# Patient Record
Sex: FEMALE | Race: WHITE | NOT HISPANIC OR LATINO | URBAN - METROPOLITAN AREA
[De-identification: names, ages, dates, MRNs, and addresses within clinical notes are randomized per-mention and may not be internally consistent; named-entity substitution may affect disease eponyms.]

---

## 2023-10-03 ENCOUNTER — PHARMACY VISIT (OUTPATIENT)
Dept: PHARMACY | Facility: CLINIC | Age: 38
End: 2023-10-03
Payer: COMMERCIAL

## 2023-10-03 DIAGNOSIS — E03.9 HYPOTHYROIDISM, UNSPECIFIED TYPE: Primary | ICD-10-CM

## 2023-10-03 PROCEDURE — RXMED WILLOW AMBULATORY MEDICATION CHARGE

## 2023-10-03 RX ORDER — LEVOTHYROXINE SODIUM 25 UG/1
TABLET ORAL
Qty: 90 TABLET | Refills: 1 | Status: SHIPPED | OUTPATIENT
Start: 2023-10-03 | End: 2024-10-02

## 2023-10-05 ENCOUNTER — PHARMACY VISIT (OUTPATIENT)
Dept: PHARMACY | Facility: CLINIC | Age: 38
End: 2023-10-05
Payer: COMMERCIAL

## 2023-10-05 PROCEDURE — RXMED WILLOW AMBULATORY MEDICATION CHARGE

## 2023-10-05 RX ORDER — BUSPIRONE HYDROCHLORIDE 30 MG/1
TABLET ORAL
Qty: 60 TABLET | Refills: 1 | OUTPATIENT
Start: 2023-10-04

## 2023-10-17 ENCOUNTER — PHARMACY VISIT (OUTPATIENT)
Dept: PHARMACY | Facility: CLINIC | Age: 38
End: 2023-10-17
Payer: COMMERCIAL

## 2023-10-17 PROCEDURE — RXMED WILLOW AMBULATORY MEDICATION CHARGE

## 2023-10-17 RX ORDER — QUETIAPINE FUMARATE 25 MG/1
TABLET, FILM COATED ORAL
Qty: 45 TABLET | Refills: 0 | OUTPATIENT
Start: 2023-10-17 | End: 2023-11-13 | Stop reason: SDUPTHER

## 2023-10-17 RX ORDER — BUPROPION HYDROCHLORIDE 300 MG/1
TABLET ORAL
Qty: 30 TABLET | Refills: 0 | OUTPATIENT
Start: 2023-10-17

## 2023-10-17 RX ORDER — BUSPIRONE HYDROCHLORIDE 30 MG/1
TABLET ORAL
Qty: 90 TABLET | Refills: 0 | OUTPATIENT
Start: 2023-10-17

## 2023-10-17 RX ORDER — PROPRANOLOL HYDROCHLORIDE 10 MG/1
TABLET ORAL
Qty: 30 TABLET | Refills: 0 | OUTPATIENT
Start: 2023-10-17

## 2023-10-20 ENCOUNTER — PHARMACY VISIT (OUTPATIENT)
Dept: PHARMACY | Facility: CLINIC | Age: 38
End: 2023-10-20
Payer: COMMERCIAL

## 2023-10-20 PROCEDURE — RXMED WILLOW AMBULATORY MEDICATION CHARGE

## 2023-10-20 RX ORDER — VENLAFAXINE HYDROCHLORIDE 75 MG/1
CAPSULE, EXTENDED RELEASE ORAL
Qty: 30 CAPSULE | Refills: 0 | OUTPATIENT
Start: 2023-10-20 | End: 2023-12-08 | Stop reason: SDUPTHER

## 2023-11-13 ENCOUNTER — PHARMACY VISIT (OUTPATIENT)
Dept: PHARMACY | Facility: CLINIC | Age: 38
End: 2023-11-13
Payer: COMMERCIAL

## 2023-11-13 PROCEDURE — RXMED WILLOW AMBULATORY MEDICATION CHARGE

## 2023-11-13 RX ORDER — BUSPIRONE HYDROCHLORIDE 30 MG/1
TABLET ORAL
Qty: 90 TABLET | Refills: 0 | OUTPATIENT
Start: 2023-11-13

## 2023-11-13 RX ORDER — VENLAFAXINE HYDROCHLORIDE 150 MG/1
CAPSULE, EXTENDED RELEASE ORAL
Qty: 30 CAPSULE | Refills: 0 | OUTPATIENT
Start: 2023-11-13 | End: 2023-12-08 | Stop reason: SDUPTHER

## 2023-11-13 RX ORDER — QUETIAPINE FUMARATE 25 MG/1
TABLET, FILM COATED ORAL
Qty: 45 TABLET | Refills: 0 | OUTPATIENT
Start: 2023-11-13 | End: 2023-12-08 | Stop reason: SDUPTHER

## 2023-11-14 ENCOUNTER — LAB REQUISITION (OUTPATIENT)
Dept: LAB | Facility: LAB | Age: 38
End: 2023-11-14

## 2023-11-14 LAB — SARS-COV-2 RNA RESP QL NAA+PROBE: NOT DETECTED

## 2023-11-14 PROCEDURE — 87635 SARS-COV-2 COVID-19 AMP PRB: CPT

## 2023-11-18 ENCOUNTER — PHARMACY VISIT (OUTPATIENT)
Dept: PHARMACY | Facility: CLINIC | Age: 38
End: 2023-11-18

## 2023-11-18 PROCEDURE — RXOTC WILLOW AMBULATORY OTC CHARGE

## 2023-12-08 PROCEDURE — RXMED WILLOW AMBULATORY MEDICATION CHARGE

## 2023-12-14 ENCOUNTER — PHARMACY VISIT (OUTPATIENT)
Dept: PHARMACY | Facility: CLINIC | Age: 38
End: 2023-12-14
Payer: COMMERCIAL

## 2024-01-20 ENCOUNTER — PHARMACY VISIT (OUTPATIENT)
Dept: PHARMACY | Facility: CLINIC | Age: 39
End: 2024-01-20
Payer: COMMERCIAL

## 2024-01-20 PROCEDURE — RXMED WILLOW AMBULATORY MEDICATION CHARGE

## 2024-01-20 PROCEDURE — RXOTC WILLOW AMBULATORY OTC CHARGE

## 2024-01-22 ENCOUNTER — PHARMACY VISIT (OUTPATIENT)
Dept: PHARMACY | Facility: CLINIC | Age: 39
End: 2024-01-22

## 2024-01-22 PROCEDURE — RXOTC WILLOW AMBULATORY OTC CHARGE

## 2024-02-03 PROCEDURE — RXMED WILLOW AMBULATORY MEDICATION CHARGE

## 2024-02-06 ENCOUNTER — PHARMACY VISIT (OUTPATIENT)
Dept: PHARMACY | Facility: CLINIC | Age: 39
End: 2024-02-06
Payer: COMMERCIAL

## 2024-02-06 PROCEDURE — RXOTC WILLOW AMBULATORY OTC CHARGE

## 2024-02-13 ENCOUNTER — PHARMACY VISIT (OUTPATIENT)
Dept: PHARMACY | Facility: CLINIC | Age: 39
End: 2024-02-13

## 2024-02-13 PROCEDURE — RXMED WILLOW AMBULATORY MEDICATION CHARGE

## 2024-02-28 ENCOUNTER — PHARMACY VISIT (OUTPATIENT)
Dept: PHARMACY | Facility: CLINIC | Age: 39
End: 2024-02-28
Payer: COMMERCIAL

## 2024-02-28 PROCEDURE — RXOTC WILLOW AMBULATORY OTC CHARGE

## 2024-02-28 PROCEDURE — RXMED WILLOW AMBULATORY MEDICATION CHARGE

## 2024-03-01 PROCEDURE — RXMED WILLOW AMBULATORY MEDICATION CHARGE

## 2024-03-06 ENCOUNTER — PHARMACY VISIT (OUTPATIENT)
Dept: PHARMACY | Facility: CLINIC | Age: 39
End: 2024-03-06
Payer: COMMERCIAL

## 2024-03-06 PROCEDURE — RXMED WILLOW AMBULATORY MEDICATION CHARGE

## 2024-03-29 ENCOUNTER — PHARMACY VISIT (OUTPATIENT)
Dept: PHARMACY | Facility: CLINIC | Age: 39
End: 2024-03-29
Payer: COMMERCIAL

## 2024-03-29 PROCEDURE — RXMED WILLOW AMBULATORY MEDICATION CHARGE

## 2024-03-29 RX ORDER — VILAZODONE HYDROCHLORIDE 40 MG/1
TABLET ORAL
Qty: 30 TABLET | Refills: 0 | OUTPATIENT
Start: 2024-03-29 | End: 2024-04-29 | Stop reason: SDUPTHER

## 2024-03-29 RX ORDER — BUSPIRONE HYDROCHLORIDE 30 MG/1
TABLET ORAL
Qty: 90 TABLET | Refills: 0 | OUTPATIENT
Start: 2024-03-29

## 2024-03-29 RX ORDER — QUETIAPINE FUMARATE 25 MG/1
TABLET, FILM COATED ORAL
Qty: 45 TABLET | Refills: 0 | OUTPATIENT
Start: 2024-03-29

## 2024-03-29 RX ORDER — VENLAFAXINE HYDROCHLORIDE 75 MG/1
CAPSULE, EXTENDED RELEASE ORAL
Qty: 30 CAPSULE | Refills: 0 | OUTPATIENT
Start: 2024-03-29

## 2024-04-17 ENCOUNTER — PHARMACY VISIT (OUTPATIENT)
Dept: PHARMACY | Facility: CLINIC | Age: 39
End: 2024-04-17

## 2024-04-17 PROCEDURE — RXOTC WILLOW AMBULATORY OTC CHARGE

## 2024-05-04 ENCOUNTER — PHARMACY VISIT (OUTPATIENT)
Dept: PHARMACY | Facility: CLINIC | Age: 39
End: 2024-05-04

## 2024-05-04 PROCEDURE — RXOTC WILLOW AMBULATORY OTC CHARGE

## 2024-05-04 PROCEDURE — RXMED WILLOW AMBULATORY MEDICATION CHARGE

## 2024-05-10 PROCEDURE — RXMED WILLOW AMBULATORY MEDICATION CHARGE

## 2024-05-15 PROCEDURE — RXMED WILLOW AMBULATORY MEDICATION CHARGE

## 2024-05-17 ENCOUNTER — PHARMACY VISIT (OUTPATIENT)
Dept: PHARMACY | Facility: CLINIC | Age: 39
End: 2024-05-17
Payer: COMMERCIAL

## 2024-06-26 ENCOUNTER — PHARMACY VISIT (OUTPATIENT)
Dept: PHARMACY | Facility: CLINIC | Age: 39
End: 2024-06-26
Payer: COMMERCIAL

## 2024-06-26 DIAGNOSIS — E03.9 HYPOTHYROIDISM, UNSPECIFIED TYPE: ICD-10-CM

## 2024-06-26 PROCEDURE — RXMED WILLOW AMBULATORY MEDICATION CHARGE

## 2024-06-26 RX ORDER — LEVOTHYROXINE SODIUM 25 UG/1
TABLET ORAL
Qty: 90 TABLET | Refills: 1 | OUTPATIENT
Start: 2024-06-26 | End: 2025-06-26

## 2024-08-01 PROCEDURE — RXMED WILLOW AMBULATORY MEDICATION CHARGE

## 2024-08-02 ENCOUNTER — PHARMACY VISIT (OUTPATIENT)
Dept: PHARMACY | Facility: CLINIC | Age: 39
End: 2024-08-02
Payer: COMMERCIAL

## 2024-08-14 ENCOUNTER — PHARMACY VISIT (OUTPATIENT)
Dept: PHARMACY | Facility: CLINIC | Age: 39
End: 2024-08-14
Payer: COMMERCIAL

## 2024-08-14 PROCEDURE — RXMED WILLOW AMBULATORY MEDICATION CHARGE

## 2024-08-14 PROCEDURE — RXOTC WILLOW AMBULATORY OTC CHARGE

## 2024-08-14 RX ORDER — DEXTROAMPHETAMINE SACCHARATE, AMPHETAMINE ASPARTATE, DEXTROAMPHETAMINE SULFATE AND AMPHETAMINE SULFATE 2.5; 2.5; 2.5; 2.5 MG/1; MG/1; MG/1; MG/1
TABLET ORAL
Qty: 30 TABLET | Refills: 0 | OUTPATIENT
Start: 2024-08-14

## 2024-08-28 PROCEDURE — RXMED WILLOW AMBULATORY MEDICATION CHARGE

## 2024-09-12 ENCOUNTER — PHARMACY VISIT (OUTPATIENT)
Dept: PHARMACY | Facility: CLINIC | Age: 39
End: 2024-09-12
Payer: COMMERCIAL

## 2024-09-24 PROCEDURE — RXMED WILLOW AMBULATORY MEDICATION CHARGE

## 2024-09-26 ENCOUNTER — PHARMACY VISIT (OUTPATIENT)
Dept: PHARMACY | Facility: CLINIC | Age: 39
End: 2024-09-26
Payer: COMMERCIAL

## 2024-10-21 PROCEDURE — RXMED WILLOW AMBULATORY MEDICATION CHARGE

## 2024-10-22 PROCEDURE — RXMED WILLOW AMBULATORY MEDICATION CHARGE

## 2024-10-25 ENCOUNTER — PHARMACY VISIT (OUTPATIENT)
Dept: PHARMACY | Facility: CLINIC | Age: 39
End: 2024-10-25
Payer: COMMERCIAL

## 2024-11-19 PROCEDURE — RXMED WILLOW AMBULATORY MEDICATION CHARGE

## 2024-11-20 ENCOUNTER — PHARMACY VISIT (OUTPATIENT)
Dept: PHARMACY | Facility: CLINIC | Age: 39
End: 2024-11-20
Payer: COMMERCIAL

## 2024-12-10 ENCOUNTER — APPOINTMENT (OUTPATIENT)
Dept: RADIOLOGY | Facility: HOSPITAL | Age: 39
End: 2024-12-10
Payer: COMMERCIAL

## 2024-12-10 ENCOUNTER — HOSPITAL ENCOUNTER (EMERGENCY)
Facility: HOSPITAL | Age: 39
Discharge: HOME | End: 2024-12-10
Payer: COMMERCIAL

## 2024-12-10 VITALS
RESPIRATION RATE: 18 BRPM | OXYGEN SATURATION: 100 % | SYSTOLIC BLOOD PRESSURE: 116 MMHG | HEIGHT: 64 IN | WEIGHT: 150 LBS | BODY MASS INDEX: 25.61 KG/M2 | DIASTOLIC BLOOD PRESSURE: 86 MMHG | HEART RATE: 82 BPM | TEMPERATURE: 98.8 F

## 2024-12-10 DIAGNOSIS — M25.552 LEFT HIP PAIN: ICD-10-CM

## 2024-12-10 DIAGNOSIS — S09.90XA INJURY OF HEAD, INITIAL ENCOUNTER: ICD-10-CM

## 2024-12-10 DIAGNOSIS — S16.1XXA STRAIN OF NECK MUSCLE, INITIAL ENCOUNTER: Primary | ICD-10-CM

## 2024-12-10 DIAGNOSIS — S93.402A SPRAIN OF LEFT ANKLE, UNSPECIFIED LIGAMENT, INITIAL ENCOUNTER: ICD-10-CM

## 2024-12-10 PROCEDURE — 72100 X-RAY EXAM L-S SPINE 2/3 VWS: CPT | Performed by: RADIOLOGY

## 2024-12-10 PROCEDURE — 70450 CT HEAD/BRAIN W/O DYE: CPT | Performed by: RADIOLOGY

## 2024-12-10 PROCEDURE — 70450 CT HEAD/BRAIN W/O DYE: CPT

## 2024-12-10 PROCEDURE — 2500000005 HC RX 250 GENERAL PHARMACY W/O HCPCS

## 2024-12-10 PROCEDURE — 72100 X-RAY EXAM L-S SPINE 2/3 VWS: CPT

## 2024-12-10 PROCEDURE — 2500000004 HC RX 250 GENERAL PHARMACY W/ HCPCS (ALT 636 FOR OP/ED)

## 2024-12-10 PROCEDURE — 72125 CT NECK SPINE W/O DYE: CPT | Performed by: RADIOLOGY

## 2024-12-10 PROCEDURE — 73630 X-RAY EXAM OF FOOT: CPT | Mod: LT

## 2024-12-10 PROCEDURE — 73502 X-RAY EXAM HIP UNI 2-3 VIEWS: CPT | Mod: LT

## 2024-12-10 PROCEDURE — 73610 X-RAY EXAM OF ANKLE: CPT | Mod: LEFT SIDE | Performed by: RADIOLOGY

## 2024-12-10 PROCEDURE — 73630 X-RAY EXAM OF FOOT: CPT | Mod: LEFT SIDE | Performed by: RADIOLOGY

## 2024-12-10 PROCEDURE — 96372 THER/PROPH/DIAG INJ SC/IM: CPT

## 2024-12-10 PROCEDURE — 99284 EMERGENCY DEPT VISIT MOD MDM: CPT | Mod: 25

## 2024-12-10 PROCEDURE — 73502 X-RAY EXAM HIP UNI 2-3 VIEWS: CPT | Mod: LEFT SIDE | Performed by: RADIOLOGY

## 2024-12-10 PROCEDURE — 72125 CT NECK SPINE W/O DYE: CPT

## 2024-12-10 PROCEDURE — 73610 X-RAY EXAM OF ANKLE: CPT | Mod: LT

## 2024-12-10 RX ORDER — LIDOCAINE 50 MG/G
1 PATCH TOPICAL DAILY
Qty: 6 PATCH | Refills: 0 | Status: SHIPPED | OUTPATIENT
Start: 2024-12-10

## 2024-12-10 RX ORDER — LIDOCAINE 560 MG/1
1 PATCH PERCUTANEOUS; TOPICAL; TRANSDERMAL ONCE
Status: DISCONTINUED | OUTPATIENT
Start: 2024-12-10 | End: 2024-12-11 | Stop reason: HOSPADM

## 2024-12-10 RX ORDER — TIZANIDINE HYDROCHLORIDE 2 MG/1
2 CAPSULE, GELATIN COATED ORAL 3 TIMES DAILY
Qty: 90 CAPSULE | Refills: 0 | Status: SHIPPED | OUTPATIENT
Start: 2024-12-10 | End: 2025-01-09

## 2024-12-10 RX ORDER — KETOROLAC TROMETHAMINE 30 MG/ML
30 INJECTION, SOLUTION INTRAMUSCULAR; INTRAVENOUS ONCE
Status: COMPLETED | OUTPATIENT
Start: 2024-12-10 | End: 2024-12-10

## 2024-12-10 RX ORDER — PREDNISONE 20 MG/1
40 TABLET ORAL DAILY
Qty: 10 TABLET | Refills: 0 | Status: SHIPPED | OUTPATIENT
Start: 2024-12-10 | End: 2024-12-15

## 2024-12-10 ASSESSMENT — LIFESTYLE VARIABLES
HAVE YOU EVER FELT YOU SHOULD CUT DOWN ON YOUR DRINKING: NO
EVER FELT BAD OR GUILTY ABOUT YOUR DRINKING: NO
EVER HAD A DRINK FIRST THING IN THE MORNING TO STEADY YOUR NERVES TO GET RID OF A HANGOVER: NO
HAVE PEOPLE ANNOYED YOU BY CRITICIZING YOUR DRINKING: NO
TOTAL SCORE: 0

## 2024-12-10 ASSESSMENT — PAIN - FUNCTIONAL ASSESSMENT: PAIN_FUNCTIONAL_ASSESSMENT: 0-10

## 2024-12-10 ASSESSMENT — COLUMBIA-SUICIDE SEVERITY RATING SCALE - C-SSRS
6. HAVE YOU EVER DONE ANYTHING, STARTED TO DO ANYTHING, OR PREPARED TO DO ANYTHING TO END YOUR LIFE?: NO
2. HAVE YOU ACTUALLY HAD ANY THOUGHTS OF KILLING YOURSELF?: NO
1. IN THE PAST MONTH, HAVE YOU WISHED YOU WERE DEAD OR WISHED YOU COULD GO TO SLEEP AND NOT WAKE UP?: NO

## 2024-12-10 ASSESSMENT — PAIN DESCRIPTION - PROGRESSION: CLINICAL_PROGRESSION: GRADUALLY WORSENING

## 2024-12-10 ASSESSMENT — PAIN DESCRIPTION - PAIN TYPE: TYPE: ACUTE PAIN

## 2024-12-10 ASSESSMENT — PAIN DESCRIPTION - LOCATION: LOCATION: HIP

## 2024-12-10 ASSESSMENT — PAIN DESCRIPTION - ORIENTATION: ORIENTATION: LEFT

## 2024-12-10 ASSESSMENT — PAIN SCALES - GENERAL: PAINLEVEL_OUTOF10: 7

## 2024-12-10 NOTE — Clinical Note
Kitty Dunbar was seen and treated in our emergency department on 12/10/2024.  She may return to work on 12/17/2024.       If you have any questions or concerns, please don't hesitate to call.      Leatha Blanchard PA-C

## 2024-12-11 NOTE — DISCHARGE INSTRUCTIONS
Thank you for choosing [facility] and [facility] for your care today. Please follow up as indicated as continued care and treatment is a very important part of your care today. Please return to this or any Emergency Department if you have any new or worsening symptoms.    Patient was seen in the emergency department today due to headache, neck pain, and left leg pain after a fall off of a stepladder.  She has a neck strain as well as a sprained ankle.  She should ice the areas and try to stay off of her left leg to help healing at least for 1 week.  She should follow-up with primary care physician as well as orthopedics within the next 2 to 3 days.

## 2024-12-11 NOTE — ED TRIAGE NOTES
Pt states she was on a step stool painting, fell back and hit head on ground. Neck pain, left ankle and left hip pain. No loc, no thinners.

## 2024-12-11 NOTE — ED PROVIDER NOTES
HPI   Chief Complaint   Patient presents with    Fall       Patient is a 39-year-old female presenting to the emergency department for evaluation of fall and head injury.  Patient states she was on a stepstool earlier today when she fell off of it falling backwards hitting her head on the ground.  She states she did not lose consciousness and is not on any blood thinners.  She is now complaining of pain in her head, neck, left hip, and left ankle.  She states she is unable to put much weight on her left leg due to the pain.  She denies any numbness or tingling down the bilateral lower extremities.  She states she has not taken any medications to help with the pain.            Patient History   No past medical history on file.  No past surgical history on file.  No family history on file.  Social History     Tobacco Use    Smoking status: Not on file    Smokeless tobacco: Not on file   Substance Use Topics    Alcohol use: Not on file    Drug use: Not on file       Physical Exam   ED Triage Vitals [12/10/24 2001]   Temperature Heart Rate Respirations BP   37.1 °C (98.8 °F) (!) 107 20 (!) 133/94      Pulse Ox Temp Source Heart Rate Source Patient Position   100 % Temporal -- Sitting      BP Location FiO2 (%)     Left arm --       Physical Exam  Vitals and nursing note reviewed.   Constitutional:       General: She is not in acute distress.     Appearance: Normal appearance. She is not ill-appearing or toxic-appearing.   HENT:      Head: Normocephalic and atraumatic.      Right Ear: Tympanic membrane normal.      Left Ear: Tympanic membrane normal.      Nose: Nose normal.      Mouth/Throat:      Mouth: Mucous membranes are moist.   Eyes:      Extraocular Movements: Extraocular movements intact.      Pupils: Pupils are equal, round, and reactive to light.   Neck:      Comments: Limited range of motion of the neck due to pain  Cardiovascular:      Rate and Rhythm: Normal rate and regular rhythm.      Pulses: Normal  pulses.      Comments: 2+ and equal DP/PT pulses bilaterally  Pulmonary:      Effort: Pulmonary effort is normal.      Breath sounds: Normal breath sounds.   Abdominal:      Palpations: Abdomen is soft.      Tenderness: There is no abdominal tenderness.   Musculoskeletal:         General: Swelling (Left lateral ankle with no obvious deformity) and tenderness (Left hip, ankle, and foot) present.      Cervical back: Tenderness (Cervical paraspinals with no midline tenderness, step-offs, deformities) present.      Comments: Tenderness to palpation along the left lateral ankle extending into the left foot with some overlying swelling.  Tenderness in the left hip joint with limited range of motion due to pain.   Skin:     General: Skin is warm and dry.   Neurological:      General: No focal deficit present.      Mental Status: She is alert and oriented to person, place, and time.   Psychiatric:         Mood and Affect: Mood normal.         Behavior: Behavior normal.         ED Course & MDM   Diagnoses as of 12/10/24 2150   Strain of neck muscle, initial encounter   Sprain of left ankle, unspecified ligament, initial encounter   Left hip pain   Injury of head, initial encounter                 No data recorded     Arley Coma Scale Score: 15 (12/10/24 2014 : Janeth Carroll RN)                           Medical Decision Making  **Disclaimer parts of this chart have been completed using voice recognition software. Please excuse any errors of transcription.     Evaluated this patient independently and my supervising physician was available for consultation.    HPI: Detailed above.    Exam: A medically appropriate exam performed, outlined above, given the known history and presentation.    History obtained from: Patient    Labs/Diagnostics:  XR hip left with pelvis when performed 2 or 3 views   Final Result   No acute osseous abnormality.             MACRO:   None        Signed by: Bernadine Mares 12/10/2024 9:31 PM    Dictation workstation:   [id number]      XR lumbar spine 2-3 views   Final Result   No evidence of acute fracture or traumatic malalignment of the lumbar   spine.             MACRO:   None        Signed by: Bernadine Mares 12/10/2024 9:32 PM   Dictation workstation:   [id number]      XR foot left 3+ views   Final Result   No acute osseous abnormality.        Mild lateral ankle soft tissue swelling.             MACRO:   None        Signed by: Bernadine Mares 12/10/2024 9:34 PM   Dictation workstation:   [id number]      XR ankle left 3+ views   Final Result   No acute osseous abnormality.        Mild lateral ankle soft tissue swelling.             MACRO:   None        Signed by: Bernadine Mares 12/10/2024 9:34 PM   Dictation workstation:   [id number]      CT head wo IV contrast   Final Result   No acute intracranial abnormality.        No acute fracture or traumatic subluxation of the cervical spine.        Disc protrusion at C6-7 resulting in effacement of the ventral thecal   sac.        MACRO:   None        Signed by: Bernadine Mares 12/10/2024 9:29 PM   Dictation workstation:   [id number]      CT cervical spine wo IV contrast   Final Result   No acute intracranial abnormality.        No acute fracture or traumatic subluxation of the cervical spine.        Disc protrusion at C6-7 resulting in effacement of the ventral thecal   sac.        MACRO:   None        Signed by: Bernadine Mares 12/10/2024 9:29 PM   Dictation workstation:   [id number]        EMERGENCY DEPARTMENT COURSE and DIFFERENTIAL DIAGNOSIS/MDM:  Patient is a 39-year-old female presenting to the emergency department for evaluation of fall and head injury.  On physical exam vital signs remarkable for hypertension and mild tachycardia but otherwise stable and patient is in no acute distress.  Patient has tenderness to palpation down the cervical paraspinals with no midline tenderness, step-offs, deformities.  She has limited range of motion of the  "neck due to pain.  Patient also has tenderness in the left hip with limited range of motion.  She is tender along the left lateral malleolus of the ankle with some overlying swelling and developing ecchymosis.  CT of the head and cervical spine ordered to rule out any fracture, malalignment, or intracranial hemorrhage.  X-ray of the left hip, ankle, and foot ordered to rule out any fracture or dislocations.  Patient given Toradol for pain as well as lidocaine patch for the neck.  X-ray of the left hip showed no acute osseous abnormality.  X-ray of the lumbar spine showed no evidence of acute fracture or traumatic malalignment.  X-ray of the left foot and ankle showed no acute osseous abnormalities with mild lateral ankle swelling.  CT of the head and cervical spine showed no acute intracranial abnormality with no acute fracture or traumatic subluxation of cervical spine with disc protrusion at C6-7.  At this time suspect patient has a left ankle sprain as well as cervical strain.  She was given a prescription for Zanaflex and lidocaine patches as well as prednisone.  She was advised to follow-up with primary care physician outpatient within the next 1 to 2 days.  She will return to the emergency department with any new or worsening symptoms.      Vitals:    Vitals:    12/10/24 2001   BP: (!) 133/94   BP Location: Left arm   Patient Position: Sitting   Pulse: (!) 107   Resp: 20   Temp: 37.1 °C (98.8 °F)   TempSrc: Temporal   SpO2: 100%   Weight: 68 kg (150 lb)   Height: 1.626 m (5' 4\")       Diagnoses as of 12/10/24 2150   Strain of neck muscle, initial encounter   Sprain of left ankle, unspecified ligament, initial encounter   Left hip pain   Injury of head, initial encounter       History Limited by:    None    Independent history obtained from:    None    External records reviewed:    None    Diagnostics interpreted by me:    CT Scan(s) see MDM and Xrays - see my independent interpretation in MDM    Discussions " with other clinicians:    None    Chronic conditions impacting care:    None    Social determinants of health affecting care:    None    Diagnostic tests considered but not performed: None    ED Medications managed:    Medications   lidocaine 4 % patch 1 patch (1 patch transdermal Medication Applied 12/10/24 2106)   ketorolac (Toradol) injection 30 mg (30 mg intramuscular Given 12/10/24 2033)       Prescription drugs considered:     Zanaflex and lidocaine patches and prednisone    Screenings:              Procedure  Procedures     Leatha Blanchard PA-C  12/10/24 2408

## 2024-12-13 ENCOUNTER — HOSPITAL ENCOUNTER (OUTPATIENT)
Dept: RADIOLOGY | Facility: CLINIC | Age: 39
Discharge: HOME | End: 2024-12-13
Payer: COMMERCIAL

## 2024-12-13 ENCOUNTER — OFFICE VISIT (OUTPATIENT)
Dept: ORTHOPEDIC SURGERY | Facility: CLINIC | Age: 39
End: 2024-12-13
Payer: COMMERCIAL

## 2024-12-13 DIAGNOSIS — M25.552 LEFT HIP PAIN: ICD-10-CM

## 2024-12-13 DIAGNOSIS — S76.012A STRAIN OF LEFT HIP, INITIAL ENCOUNTER: Primary | ICD-10-CM

## 2024-12-13 PROCEDURE — 73502 X-RAY EXAM HIP UNI 2-3 VIEWS: CPT | Mod: LT

## 2024-12-13 PROCEDURE — 99204 OFFICE O/P NEW MOD 45 MIN: CPT

## 2024-12-13 RX ORDER — MELOXICAM 15 MG/1
15 TABLET ORAL DAILY
Qty: 14 TABLET | Refills: 0 | Status: SHIPPED | OUTPATIENT
Start: 2024-12-13 | End: 2024-12-27

## 2024-12-13 ASSESSMENT — PAIN SCALES - GENERAL: PAINLEVEL_OUTOF10: 7

## 2024-12-13 ASSESSMENT — PAIN - FUNCTIONAL ASSESSMENT: PAIN_FUNCTIONAL_ASSESSMENT: 0-10

## 2024-12-13 NOTE — LETTER
December 13, 2024     Patient: Kitty Dunbar   YOB: 1985   Date of Visit: 12/13/2024       To Whom It May Concern:    It is my medical opinion that Kitty Dunbar should remain out of work until she has her MRI and sees me back in clinic .    If you have any questions or concerns, please don't hesitate to call.         Sincerely,        Nilsa Hirsch PA-C    CC: No Recipients

## 2024-12-13 NOTE — PROGRESS NOTES
Kitty Dunbar  is a 39 y.o. year-old  female. she  is a new patient to our office and presents with a chief complaint of Left  hip injury. States at on 12/10/24 she was stepping down from a stepstool, stepping into a raised dog bowel with her left foot, twisted on the leg and fell backwards and landed on her backside. She did hit her head, she was seen in the ED same day and had a workup for this. Was referred to us for left hip pain. Pain is located in her groin. Burning type pain. Has weakness of her hip. Feels a popping in the groin. Pain with hip flexion. Is not able to walk stairs due to the pain. Chronic low back pain, no new numbness/tingling. NSAIDS including [IBU/Naproxen] have also been used with minimal improvement. They [have not] attempted physical therapy. They [have not] had a corticosteroid injection. They [have not] had a previous hip surgery. She has also tried an oral steroid.      Past Medical, Family, and Social History reviewed and inlcude:[none] all other history pertinent to the presenting problem  Review of Systems  A complete review of systems was conducted, pertinent only to the HPI noted above.  Constitutional: None  Eyes: No additions to above history  Ears, Nose, Throat: No additions to above history  Cardiovascular: No additions to above history  Respiratory: No additions to above history  GI: No additions to above history  : No additions to above history  Skin/Neuro: No additions to above history  Endocrine/Heme/Lymph: No additions to above history  Immunologic: No additions to above history  Psychiatric: No additions to above history  Musculoskeletal: see above  Eyes: sclera anicteric  ENT: hearing appropriate for normal conversation, neck appears symmetric with no gross thyromegaly  Pulm: No labored breathing, no wheezing  CVS: Regular rate and rhythm  Abd: Non-distended  Skin: No rashes, erythema, or induration around hip    MUSCULOSKELETAL EXAM: HIP      Left HIP:  Limited  due to pain. Cannot flex to 90 degrees.    IR@60: [20] degrees without pain   ER@60: [40] degrees without pain   Unable to test FADIR due to pain      Neurovascularly Intact to Femroal/obturator/LFCN/S/S/SPN/DPN/T nerves on bilateral extremities    Xrays independently interpreted and  reviewed: femoral head well seated within acetabulum. Cam lesion noted. No fracture or dislocation.     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 She is also having tendinitis of her hip flexors.     PLAN:  Given her injury and clinical exam findings, recommend obtaining a 3T MRI to evaluate for labral injury. She elected to proceed with this, order was placed. She does not feel steroids are helped with her pain. Offered to send her a short course of Meloxicam. She was educated that she cannot take steroids and NSAIDs together. No NSAIDs same day as steroid. She expressed understanding. She will follow up after mri to review and further recommendations will be made at that time. All questions answered, patient in agreement with plan.
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
## 2024-12-17 ENCOUNTER — APPOINTMENT (OUTPATIENT)
Dept: NEUROSURGERY | Facility: CLINIC | Age: 39
End: 2024-12-17
Payer: COMMERCIAL

## 2024-12-17 VITALS
WEIGHT: 149.91 LBS | DIASTOLIC BLOOD PRESSURE: 60 MMHG | RESPIRATION RATE: 18 BRPM | SYSTOLIC BLOOD PRESSURE: 102 MMHG | HEIGHT: 64 IN | HEART RATE: 84 BPM | BODY MASS INDEX: 25.59 KG/M2

## 2024-12-17 DIAGNOSIS — M25.552 LEFT HIP PAIN: Primary | ICD-10-CM

## 2024-12-17 PROCEDURE — 3008F BODY MASS INDEX DOCD: CPT | Performed by: NEUROLOGICAL SURGERY

## 2024-12-17 PROCEDURE — 99202 OFFICE O/P NEW SF 15 MIN: CPT | Performed by: NEUROLOGICAL SURGERY

## 2024-12-17 ASSESSMENT — PATIENT HEALTH QUESTIONNAIRE - PHQ9
1. LITTLE INTEREST OR PLEASURE IN DOING THINGS: NOT AT ALL
SUM OF ALL RESPONSES TO PHQ9 QUESTIONS 1 AND 2: 0
2. FEELING DOWN, DEPRESSED OR HOPELESS: NOT AT ALL

## 2024-12-17 ASSESSMENT — ENCOUNTER SYMPTOMS
DEPRESSION: 1
OCCASIONAL FEELINGS OF UNSTEADINESS: 1
LOSS OF SENSATION IN FEET: 0

## 2024-12-17 ASSESSMENT — PAIN SCALES - GENERAL: PAINLEVEL_OUTOF10: 8

## 2024-12-17 ASSESSMENT — COLUMBIA-SUICIDE SEVERITY RATING SCALE - C-SSRS
2. HAVE YOU ACTUALLY HAD ANY THOUGHTS OF KILLING YOURSELF?: NO
1. IN THE PAST MONTH, HAVE YOU WISHED YOU WERE DEAD OR WISHED YOU COULD GO TO SLEEP AND NOT WAKE UP?: NO

## 2024-12-24 ENCOUNTER — TELEPHONE (OUTPATIENT)
Dept: NEUROSURGERY | Facility: HOSPITAL | Age: 39
End: 2024-12-24
Payer: COMMERCIAL

## 2024-12-24 NOTE — TELEPHONE ENCOUNTER
Left message that we do not fill out FMLA paper work unless having surgery, that it would start the day of surgery. Asked her to discuss with her PCP and pain management doctor once she sees them.

## 2024-12-30 ENCOUNTER — PHARMACY VISIT (OUTPATIENT)
Dept: PHARMACY | Facility: CLINIC | Age: 39
End: 2024-12-30
Payer: COMMERCIAL

## 2024-12-30 PROCEDURE — RXMED WILLOW AMBULATORY MEDICATION CHARGE

## 2024-12-31 ENCOUNTER — APPOINTMENT (OUTPATIENT)
Dept: RADIOLOGY | Facility: CLINIC | Age: 39
End: 2024-12-31
Payer: COMMERCIAL

## 2025-01-02 ENCOUNTER — HOSPITAL ENCOUNTER (OUTPATIENT)
Dept: RADIOLOGY | Facility: HOSPITAL | Age: 40
Discharge: HOME | End: 2025-01-02
Payer: COMMERCIAL

## 2025-01-02 DIAGNOSIS — M25.552 LEFT HIP PAIN: ICD-10-CM

## 2025-01-02 PROCEDURE — 73721 MRI JNT OF LWR EXTRE W/O DYE: CPT | Mod: LT

## 2025-01-02 PROCEDURE — 73721 MRI JNT OF LWR EXTRE W/O DYE: CPT | Mod: LEFT SIDE | Performed by: STUDENT IN AN ORGANIZED HEALTH CARE EDUCATION/TRAINING PROGRAM

## 2025-01-03 ENCOUNTER — APPOINTMENT (OUTPATIENT)
Dept: ORTHOPEDIC SURGERY | Facility: CLINIC | Age: 40
End: 2025-01-03
Payer: COMMERCIAL

## 2025-01-03 DIAGNOSIS — S76.012A STRAIN OF LEFT HIP, INITIAL ENCOUNTER: ICD-10-CM

## 2025-01-03 PROCEDURE — 99214 OFFICE O/P EST MOD 30 MIN: CPT | Performed by: STUDENT IN AN ORGANIZED HEALTH CARE EDUCATION/TRAINING PROGRAM

## 2025-01-03 NOTE — PROGRESS NOTES
Kitty Dunbar  is a 39 y.o. year-old  female. she  is a new patient to our office and presents with a chief complaint of Left  hip injury. States at on 12/10/24 she was stepping down from a stepstool, stepping into a raised dog bowel with her left foot, twisted on the leg and fell backwards and landed on her backside.  Since then she describes pain in the anterior hip and groin and wraps down into the medial aspect of her leg and into the knee.  That she is also having some numbness and tingling down the leg and has a known history of lumbar disc issues.  She is really having a hard time walking or even standing at home.  She has not been able to return to work.  She has tried NSAIDs as well as an oral steroid.     Past Medical, Family, and Social History reviewed and inlcude:[none] all other history pertinent to the presenting problem  Review of Systems  A complete review of systems was conducted, pertinent only to the HPI noted above.  Constitutional: None  Eyes: No additions to above history  Ears, Nose, Throat: No additions to above history  Cardiovascular: No additions to above history  Respiratory: No additions to above history  GI: No additions to above history  : No additions to above history  Skin/Neuro: No additions to above history  Endocrine/Heme/Lymph: No additions to above history  Immunologic: No additions to above history  Psychiatric: No additions to above history  Musculoskeletal: see above  Eyes: sclera anicteric  ENT: hearing appropriate for normal conversation, neck appears symmetric with no gross thyromegaly  Pulm: No labored breathing, no wheezing  CVS: Regular rate and rhythm  Abd: Non-distended  Skin: No rashes, erythema, or induration around hip    MUSCULOSKELETAL EXAM: HIP      Left HIP:     IR@60: [20] degrees with pain   ER@60: [40] degrees with pain   Positive FADIR, mild tenderness over the lateral aspect of her hip positive [facility]      Neurovascularly Intact to  Femroal/obturator/LFCN/S/S/SPN/DPN/T nerves on bilateral extremities    Xrays independently interpreted and  reviewed: femoral head well seated within acetabulum. Cam lesion noted. No fracture or dislocation.   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     PLAN:  I reviewed with the patient probable anterior superior labral tear caused by her injury.  Given her ongoing symptoms I recommend referral for an ultrasound-guided corticosteroid hip injection.  Have also referred her for physical therapy.  She has not yet ready to return to work.  Will have her evaluated back in the office in 3 to 4 weeks if her symptoms do not improve.  All questions answered, patient in agreement with plan.

## 2025-01-06 ENCOUNTER — HOSPITAL ENCOUNTER (OUTPATIENT)
Dept: RADIOLOGY | Facility: EXTERNAL LOCATION | Age: 40
Discharge: HOME | End: 2025-01-06
Payer: COMMERCIAL

## 2025-01-06 ENCOUNTER — OFFICE VISIT (OUTPATIENT)
Dept: ORTHOPEDIC SURGERY | Facility: CLINIC | Age: 40
End: 2025-01-06
Payer: COMMERCIAL

## 2025-01-06 VITALS — HEIGHT: 64 IN | BODY MASS INDEX: 25.44 KG/M2 | WEIGHT: 149 LBS

## 2025-01-06 DIAGNOSIS — M25.852 FEMOROACETABULAR IMPINGEMENT OF LEFT HIP: Primary | ICD-10-CM

## 2025-01-06 DIAGNOSIS — M25.552 LEFT HIP PAIN: ICD-10-CM

## 2025-01-06 DIAGNOSIS — S73.192A ACETABULAR LABRUM TEAR, LEFT, INITIAL ENCOUNTER: ICD-10-CM

## 2025-01-06 PROCEDURE — 99214 OFFICE O/P EST MOD 30 MIN: CPT | Mod: 25 | Performed by: STUDENT IN AN ORGANIZED HEALTH CARE EDUCATION/TRAINING PROGRAM

## 2025-01-06 PROCEDURE — 20611 DRAIN/INJ JOINT/BURSA W/US: CPT | Mod: LT | Performed by: STUDENT IN AN ORGANIZED HEALTH CARE EDUCATION/TRAINING PROGRAM

## 2025-01-06 PROCEDURE — 2500000004 HC RX 250 GENERAL PHARMACY W/ HCPCS (ALT 636 FOR OP/ED): Performed by: STUDENT IN AN ORGANIZED HEALTH CARE EDUCATION/TRAINING PROGRAM

## 2025-01-06 PROCEDURE — 99244 OFF/OP CNSLTJ NEW/EST MOD 40: CPT | Performed by: STUDENT IN AN ORGANIZED HEALTH CARE EDUCATION/TRAINING PROGRAM

## 2025-01-06 PROCEDURE — RXMED WILLOW AMBULATORY MEDICATION CHARGE

## 2025-01-06 PROCEDURE — 3008F BODY MASS INDEX DOCD: CPT | Performed by: STUDENT IN AN ORGANIZED HEALTH CARE EDUCATION/TRAINING PROGRAM

## 2025-01-06 RX ORDER — MELOXICAM 15 MG/1
15 TABLET ORAL DAILY PRN
Qty: 30 TABLET | Refills: 0 | Status: SHIPPED | OUTPATIENT
Start: 2025-01-06 | End: 2025-01-24 | Stop reason: SDUPTHER

## 2025-01-06 RX ADMIN — METHYLPREDNISOLONE ACETATE 80 MG: 40 INJECTION, SUSPENSION INTRA-ARTICULAR; INTRALESIONAL; INTRAMUSCULAR; INTRASYNOVIAL; SOFT TISSUE at 16:32

## 2025-01-06 RX ADMIN — LIDOCAINE HYDROCHLORIDE 2 ML: 10 INJECTION, SOLUTION INFILTRATION; PERINEURAL at 16:32

## 2025-01-06 RX ADMIN — ROPIVACAINE HYDROCHLORIDE 2 ML: 5 INJECTION EPIDURAL; INFILTRATION; PERINEURAL at 16:32

## 2025-01-06 ASSESSMENT — PAIN SCALES - GENERAL: PAINLEVEL_OUTOF10: 5 - MODERATE PAIN

## 2025-01-06 ASSESSMENT — PAIN - FUNCTIONAL ASSESSMENT: PAIN_FUNCTIONAL_ASSESSMENT: 0-10

## 2025-01-06 NOTE — PROGRESS NOTES
Sports Medicine Office Note    Today's Date:  01/06/2025     HPI: Kitty Dunbar is a 39 y.o. *** who presents today for ***    ***    *** has no other complaints.    Physical Examination:     ***    Imaging:  Radiographs of the *** were reviewed and revealed ***.  The studies were reviewed by me personally in the office today.    === 12/13/24 ===    XR HIP LEFT WITH PELVIS WHEN PERFORMED 2 OR 3 VIEWS    - Impression -  1. Cam morphology of the left femur which can be associated with  femoroacetabular impingement.    MACRO:  None.    Signed by: Janet Yates 12/14/2024 9:04 AM  Dictation workstation:   [id number]    Problem List Items Addressed This Visit    None    Procedure Note:  After consent was obtained, the left hip was prepped in a sterile fashion. Ultrasound guidance was used to help insure proper needle placement into the  joint , decrease patient discomfort, and decrease collateral damage. The joint was visualized and Depo-Medrol 80 mg with lidocaine 1% 2 mL & ropivacaine 0.5% 2 mL were injected without any complications. Ultrasound images were saved on an internal file for later reference. The patient tolerated the procedure well and the area was cleaned and bandaged.  Patient ID: Kitty Dunbar is a 39 y.o. female.    L Inj/Asp: L hip joint on 1/6/2025 4:32 PM  Indications: pain  Details: 22 G needle, ultrasound-guided anterior approach  Medications: 2 mL lidocaine 10 mg/mL (1 %); 80 mg methylPREDNISolone acetate 40 mg/mL; 2 mL ropivacaine 5 mg/mL (0.5 %)  Outcome: tolerated well, no immediate complications  Procedure, treatment alternatives, risks and benefits explained, specific risks discussed. Consent was given by the patient. Immediately prior to procedure a time out was called to verify the correct patient, procedure, equipment, support staff and site/side marked as required. Patient was prepped and draped in the usual sterile fashion.         Assessment and Plan:    We reviewed the exam  "and imaging findings and discussed the conservative and surgical treatment options. We agreed to a diagnostic and hopefully therapeutic cortisone injection into the ***.  {Blank single:75827::\" He\",\" She\"} tolerated this well. Activity modifications were reviewed. {Blank single:19016::\" He\",\" She\"} will keep any scheduled follow-up with ***. Physical therapy referral *** provided and discussed the importance of regular home exercises.  Recommended prescription doses of Tylenol and encouraged use of ice or heat as needed for acute flares of pain.  Plan for follow-up in *** for re-evaluation, otherwise may follow-up sooner if any new concerns arise.  Discussed this plan with the patient who is understanding and agreeable.    **This note was dictated using Dragon speech recognition software and was not corrected for spelling or grammatical errors**.    Duc Greco, DO  Primary Care Sports Medicine  [facility] [place]   " tolerated the procedure well and the area was cleaned and bandaged.  Patient ID: Kitty Dunbar is a 39 y.o. female.    L Inj/Asp: L hip joint on 1/6/2025 4:32 PM  Indications: pain  Details: 22 G needle, ultrasound-guided anterior approach  Medications: 2 mL lidocaine 10 mg/mL (1 %); 80 mg methylPREDNISolone acetate 40 mg/mL; 2 mL ropivacaine 5 mg/mL (0.5 %)  Outcome: tolerated well, no immediate complications  Procedure, treatment alternatives, risks and benefits explained, specific risks discussed. Consent was given by the patient. Immediately prior to procedure a time out was called to verify the correct patient, procedure, equipment, support staff and site/side marked as required. Patient was prepped and draped in the usual sterile fashion.         Assessment and Plan:    We reviewed the exam and imaging findings and discussed the conservative and surgical treatment options. We agreed to a diagnostic and hopefully therapeutic cortisone injection into the left hip joint.   She tolerated this well. Activity modifications were reviewed.  She will keep any scheduled follow-up with Dr. Ragsdale and with pain management.  Encouraged her to follow-up with physical therapy and discussed the importance of continuing regular home exercises.  Recommended prescription doses of Tylenol and encouraged use of ice or heat as needed for acute flares of pain.  She may otherwise continue meloxicam as prescribed and instructed her to avoid other NSAIDs when taking this medication.  Refill provided.  Discussed with patient that we may consider repeating cortisone injection in 3 months or more if she gets adequate pain relief and pain returns.  If she does not get significant relief, she should follow-up with Dr. Ragsdale to discuss further management.  Plan for follow-up in 3 months as needed for re-evaluation, otherwise may follow-up sooner if any new concerns arise.  Discussed this plan with the patient who is understanding and  agreeable.    **A copy of today's note will be sent to referring provider, Dr. Ragsdale**    **This note was dictated using Dragon speech recognition software and was not corrected for spelling or grammatical errors**.    Duc Greco,   Primary Care Sports Medicine  [facility] [place]

## 2025-01-07 ENCOUNTER — APPOINTMENT (OUTPATIENT)
Dept: ORTHOPEDIC SURGERY | Facility: HOSPITAL | Age: 40
End: 2025-01-07
Payer: COMMERCIAL

## 2025-01-07 DIAGNOSIS — M25.572 PAIN IN LEFT ANKLE AND JOINTS OF LEFT FOOT: ICD-10-CM

## 2025-01-08 ENCOUNTER — PHARMACY VISIT (OUTPATIENT)
Dept: PHARMACY | Facility: CLINIC | Age: 40
End: 2025-01-08
Payer: COMMERCIAL

## 2025-01-08 PROCEDURE — RXMED WILLOW AMBULATORY MEDICATION CHARGE

## 2025-01-08 RX ORDER — ZOLPIDEM TARTRATE 5 MG/1
TABLET ORAL
Qty: 30 TABLET | Refills: 0 | OUTPATIENT
Start: 2025-01-08

## 2025-01-14 ENCOUNTER — OFFICE VISIT (OUTPATIENT)
Dept: PRIMARY CARE | Facility: CLINIC | Age: 40
End: 2025-01-14
Payer: COMMERCIAL

## 2025-01-14 ENCOUNTER — APPOINTMENT (OUTPATIENT)
Dept: PRIMARY CARE | Facility: CLINIC | Age: 40
End: 2025-01-14
Payer: COMMERCIAL

## 2025-01-14 VITALS
SYSTOLIC BLOOD PRESSURE: 110 MMHG | OXYGEN SATURATION: 97 % | DIASTOLIC BLOOD PRESSURE: 80 MMHG | BODY MASS INDEX: 28.65 KG/M2 | WEIGHT: 167.8 LBS | TEMPERATURE: 97.4 F | HEART RATE: 83 BPM | HEIGHT: 64 IN

## 2025-01-14 DIAGNOSIS — S16.1XXA STRAIN OF NECK MUSCLE, INITIAL ENCOUNTER: ICD-10-CM

## 2025-01-14 DIAGNOSIS — Z00.00 ANNUAL PHYSICAL EXAM: Primary | ICD-10-CM

## 2025-01-14 DIAGNOSIS — F33.2 MAJOR DEPRESSIVE DISORDER, RECURRENT SEVERE WITHOUT PSYCHOTIC FEATURES (MULTI): ICD-10-CM

## 2025-01-14 DIAGNOSIS — F10.20 ALCOHOL DEPENDENCE, UNCOMPLICATED (MULTI): ICD-10-CM

## 2025-01-14 DIAGNOSIS — Z12.31 ENCOUNTER FOR SCREENING MAMMOGRAM FOR MALIGNANT NEOPLASM OF BREAST: ICD-10-CM

## 2025-01-14 PROBLEM — F43.21 SITUATIONAL DEPRESSION: Status: ACTIVE | Noted: 2025-01-14

## 2025-01-14 PROBLEM — J30.9 ALLERGIC RHINITIS: Status: ACTIVE | Noted: 2025-01-14

## 2025-01-14 PROBLEM — F41.9 ANXIETY: Status: ACTIVE | Noted: 2022-02-18

## 2025-01-14 PROBLEM — F43.23 SITUATIONAL MIXED ANXIETY AND DEPRESSIVE DISORDER: Status: ACTIVE | Noted: 2025-01-14

## 2025-01-14 PROBLEM — F41.8 MIXED ANXIETY DEPRESSIVE DISORDER: Status: ACTIVE | Noted: 2025-01-14

## 2025-01-14 PROBLEM — E55.9 VITAMIN D DEFICIENCY: Status: ACTIVE | Noted: 2022-02-18

## 2025-01-14 PROBLEM — E03.9 HYPOTHYROIDISM: Status: ACTIVE | Noted: 2022-08-12

## 2025-01-14 PROBLEM — F51.01 PRIMARY INSOMNIA: Status: ACTIVE | Noted: 2025-01-14

## 2025-01-14 PROBLEM — K58.1 IRRITABLE BOWEL SYNDROME WITH CONSTIPATION: Status: ACTIVE | Noted: 2025-01-14

## 2025-01-14 PROBLEM — R61 GENERALIZED HYPERHIDROSIS: Status: ACTIVE | Noted: 2023-02-16

## 2025-01-14 PROCEDURE — 1036F TOBACCO NON-USER: CPT | Performed by: FAMILY MEDICINE

## 2025-01-14 PROCEDURE — 3008F BODY MASS INDEX DOCD: CPT | Performed by: FAMILY MEDICINE

## 2025-01-14 PROCEDURE — 99395 PREV VISIT EST AGE 18-39: CPT | Performed by: FAMILY MEDICINE

## 2025-01-14 RX ORDER — TIZANIDINE HYDROCHLORIDE 4 MG/1
4 CAPSULE, GELATIN COATED ORAL 3 TIMES DAILY
Qty: 90 CAPSULE | Refills: 0 | Status: SHIPPED | OUTPATIENT
Start: 2025-01-14 | End: 2026-01-14

## 2025-01-14 RX ORDER — LIDOCAINE 50 MG/G
1 PATCH TOPICAL DAILY
Qty: 30 PATCH | Refills: 1 | Status: SHIPPED | OUTPATIENT
Start: 2025-01-14

## 2025-01-14 ASSESSMENT — LIFESTYLE VARIABLES
HOW OFTEN DO YOU HAVE SIX OR MORE DRINKS ON ONE OCCASION: NEVER
SKIP TO QUESTIONS 9-10: 1
HOW MANY STANDARD DRINKS CONTAINING ALCOHOL DO YOU HAVE ON A TYPICAL DAY: 1 OR 2
HOW OFTEN DO YOU HAVE A DRINK CONTAINING ALCOHOL: 2-4 TIMES A MONTH
AUDIT-C TOTAL SCORE: 2

## 2025-01-14 ASSESSMENT — PATIENT HEALTH QUESTIONNAIRE - PHQ9
SUM OF ALL RESPONSES TO PHQ9 QUESTIONS 1 AND 2: 0
2. FEELING DOWN, DEPRESSED OR HOPELESS: NOT AT ALL
1. LITTLE INTEREST OR PLEASURE IN DOING THINGS: NOT AT ALL

## 2025-01-14 ASSESSMENT — ENCOUNTER SYMPTOMS
OCCASIONAL FEELINGS OF UNSTEADINESS: 0
LOSS OF SENSATION IN FEET: 0
DEPRESSION: 1

## 2025-01-14 ASSESSMENT — PAIN SCALES - GENERAL: PAINLEVEL_OUTOF10: 6

## 2025-01-14 NOTE — PROGRESS NOTES
History Of Present Illness  Kitty Dunbar is a 39 y.o. female presenting for Follow-up (Hip, back, left ankle and leg injury on Dec 10th.)  .    \[facility] [place] [facility]\""   Health maintenance: sees LINDA, last pap 2021.     She was in the ER on 12/10/2024 after a fall and head injury on a stepstool at home.  Patient was diagnosed with strain of her neck, sprain of her left ankle and left hip.  She has since seen Ortho on 12/13/2024 for the hip.  She saw neurosurgery on 12/17/2024 and was recommended to have physical therapy and pain medicine evaluation.  She saw a new orthopedic on 1/3/2025, Dr. Ragsdale and was referred for an ultrasound-guided corticosteroid hip injection that was completed on 1/6/2025 which she reports was not effective yet.  Walks with a limp but stable. Tizanidine helps with neck pain but has been taking two of the 2mg tabs.     Hypothyroidism - clinically euthyroid. However, she discontinued Synthroid months ago.      Past Medical History  Patient Active Problem List    Diagnosis Date Noted    Allergic rhinitis 01/14/2025    Irritable bowel syndrome with constipation 01/14/2025    Primary insomnia 01/14/2025    Mixed anxiety depressive disorder 01/14/2025    Situational depression 01/14/2025    Situational mixed anxiety and depressive disorder 01/14/2025    Major depressive disorder, recurrent severe without psychotic features (Multi) 01/14/2025    Alcohol dependence, uncomplicated (Multi) 01/14/2025    Strain of left hip 12/13/2024    Left hip pain 12/13/2024    Generalized hyperhidrosis 02/16/2023    Hypothyroidism 08/12/2022    Anxiety 02/18/2022    Vitamin D deficiency 02/18/2022    Kidney stones 08/21/2012        Medications  Current Outpatient Medications   Medication Sig Dispense Refill    amphetamine-dextroamphetamine XR (Adderall XR) 20 mg 24 hr capsule Take 1 capsule by mouth once daily every day 30 capsule 0    meloxicam (Mobic) 15 mg tablet Take 1 tablet (15 mg) by mouth once daily as needed for  "moderate pain (4 - 6). 30 tablet 0    lidocaine (Lidoderm) 5 % patch Place 1 patch over 12 hours on the skin once daily. Remove & discard patch within 12 hours or as directed by MD. 30 patch 1    Synthroid 25 mcg tablet TAKE 1 TABLET BY MOUTH IN THE MORNING ON AN EMPTY STOMACH ORALLY ONCE A DAY 90 tablet 1    tiZANidine (Zanaflex) 4 mg capsule Take 1 capsule (4 mg) by mouth 3 times a day. 90 capsule 0    vilazodone (Viibryd) 10 mg tablet Take 1 tablet by mouth once a day 30 tablet 0     No current facility-administered medications for this visit.        Surgical History  She has a past surgical history that includes  section, classic (); Breast surgery (); and Dilation and curettage of uterus.     Social History  She reports that she has never smoked. She has never used smokeless tobacco. She reports current alcohol use. She reports that she does not use drugs.    Family History  No family history on file.     Allergies  Ambien [zolpidem], Effexor [venlafaxine], Shellfish derived, Sulfa (sulfonamide antibiotics), and Trazodone    Immunizations  Immunization History   Administered Date(s) Administered    Moderna SARS-CoV-2 Vaccination 01/15/2021, 2021    Tdap vaccine, age 7 year and older (BOOSTRIX, ADACEL) 10/16/2017        ROS  Negative, except as discussed in HPI     Vitals  /80   Pulse 83   Temp 36.3 °C (97.4 °F)   Ht 1.626 m (5' 4\")   Wt 76.1 kg (167 lb 12.8 oz)   LMP 01/10/2025 (Exact Date)   SpO2 97%   BMI 28.80 kg/m²      Physical Exam  Vitals and nursing note reviewed.   Constitutional:       General: She is not in acute distress.     Appearance: Normal appearance.   Cardiovascular:      Rate and Rhythm: Normal rate and regular rhythm.      Heart sounds: Normal heart sounds.   Pulmonary:      Effort: No respiratory distress.      Breath sounds: Normal breath sounds.   Neurological:      General: No focal deficit present.      Mental Status: She is alert. Mental status is at " "baseline.         Relevant Results  Lab Results   Component Value Date    WBC 4.5 02/16/2023    WBC 5.0 05/26/2022    HGB 13.9 02/16/2023    HGB 15.6 (H) 08/09/2022    HCT 40.9 02/16/2023    HCT 45.0 (H) 08/09/2022    MCV 94.5 02/16/2023    MCV 92.6 05/26/2022     02/16/2023     05/26/2022     Lab Results   Component Value Date     02/16/2023     02/18/2022    K 3.6 02/16/2023    K 4.1 02/18/2022     02/16/2023     02/18/2022    CO2 23 (L) 02/16/2023    CO2 25 02/18/2022    BUN 13 02/16/2023    BUN 13 02/18/2022    CREATININE 0.7 02/16/2023    CREATININE 0.6 02/18/2022    CALCIUM 9.0 02/16/2023    CALCIUM 8.9 02/18/2022    PROT 7.1 02/16/2023    PROT 6.6 02/18/2022    BILITOT 0.6 02/16/2023    BILITOT 0.2 02/18/2022    ALKPHOS 60 02/16/2023    ALKPHOS 55 02/18/2022    ALT 12 02/16/2023    ALT 11 02/18/2022    AST 16 02/16/2023    AST 11 02/18/2022    GLUCOSE 81 02/16/2023    GLUCOSE 109 (H) 02/18/2022     No results found for: \"HGBA1C\"  Lab Results   Component Value Date    TSH 2.61 02/16/2023    FREET4 1.0 05/05/2021      Lab Results   Component Value Date    CHOL 157 02/16/2023    TRIG 41 02/16/2023    HDL 91 02/16/2023           Assessment/Plan   Kitty was seen today for follow-up.  Diagnoses and all orders for this visit:  Annual physical exam (Primary)  -     Comprehensive Metabolic Panel; Future  -     Lipid Panel; Future  -     TSH with reflex to Free T4 if abnormal; Future  -     CBC; Future  Strain of neck muscle, initial encounter  -     tiZANidine (Zanaflex) 4 mg capsule; Take 1 capsule (4 mg) by mouth 3 times a day.  -     lidocaine (Lidoderm) 5 % patch; Place 1 patch over 12 hours on the skin once daily. Remove & discard patch within 12 hours or as directed by MD.  Encounter for screening mammogram for malignant neoplasm of breast  Comments:  schedule mammogram after April 4  Orders:  -     BI mammo bilateral screening tomosynthesis; Future  Major depressive " disorder, recurrent severe without psychotic features (Multi)  Comments:  sees psychiatry  Alcohol dependence, uncomplicated (Multi)  Comments:  sees psychiatry         Counseling:   Medication education:   -Education:  The patient is counseled regarding potential side-effects of any and all new medications  -Understanding:  Patient expressed understanding of information discussed today  -Adherence:  No barriers to adherence identified    Final treatment plan is a result of shared decision making with patient.         Gucci Liu MD

## 2025-01-17 PROCEDURE — RXMED WILLOW AMBULATORY MEDICATION CHARGE

## 2025-01-21 ENCOUNTER — OFFICE VISIT (OUTPATIENT)
Dept: ORTHOPEDIC SURGERY | Facility: CLINIC | Age: 40
End: 2025-01-21
Payer: COMMERCIAL

## 2025-01-21 DIAGNOSIS — M25.852 FEMOROACETABULAR IMPINGEMENT OF LEFT HIP: ICD-10-CM

## 2025-01-21 DIAGNOSIS — S73.192A ACETABULAR LABRUM TEAR, LEFT, INITIAL ENCOUNTER: Primary | ICD-10-CM

## 2025-01-21 DIAGNOSIS — M54.16 LUMBAR RADICULOPATHY: ICD-10-CM

## 2025-01-21 PROCEDURE — 99214 OFFICE O/P EST MOD 30 MIN: CPT | Performed by: STUDENT IN AN ORGANIZED HEALTH CARE EDUCATION/TRAINING PROGRAM

## 2025-01-21 NOTE — PROGRESS NOTES
Kitty Dunbar  is a 39 y.o. year-old  female. she returns regarding her hip.  She did get both intra-articular corticosteroid injection.  This is roughly 3 weeks ago.  She reports no relief whatsoever not even during the anesthetic phase.  Again she has pain in the groin and wraps along the medial thigh and into the knee but also having some new symptoms now of some numbness and tingling pain shooting down her leg.  She notes a previous history of sciatica many years ago.  There are some components of feel the same but also different with the hip symptoms.  Has not yet started therapy     Past Medical, Family, and Social History reviewed and inlcude:[none] all other history pertinent to the presenting problem  Review of Systems  A complete review of systems was conducted, pertinent only to the HPI noted above.  Constitutional: None  Eyes: No additions to above history  Ears, Nose, Throat: No additions to above history  Cardiovascular: No additions to above history  Respiratory: No additions to above history  GI: No additions to above history  : No additions to above history  Skin/Neuro: No additions to above history  Endocrine/Heme/Lymph: No additions to above history  Immunologic: No additions to above history  Psychiatric: No additions to above history  Musculoskeletal: see above  Eyes: sclera anicteric  ENT: hearing appropriate for normal conversation, neck appears symmetric with no gross thyromegaly  Pulm: No labored breathing, no wheezing  CVS: Regular rate and rhythm  Abd: Non-distended  Skin: No rashes, erythema, or induration around hip    MUSCULOSKELETAL EXAM: HIP      Left HIP:     IR@60: [20] degrees with pain   ER@60: [40] degrees with pain   Positive FADIR, mild tenderness over the lateral aspect of her hip positive [facility]      Neurovascularly Intact to Femroal/obturator/LFCN/S/S/SPN/DPN/T nerves on bilateral extremities    Xrays independently interpreted and  reviewed: femoral head well  seated within acetabulum. Cam lesion noted. No fracture or dislocation.   MRI independently reviewed, nonspecific small effusions and bilateral hip irregularity along anterior superior labrum which could represent tear    The patient history, physical examination and imaging studies are consistent with the diagnosis of [femoroacetabular impingement (MARIELLA)] lumbar radiculopathy.     PLAN:  I did review with the patient that somewhat concerned she had no response to the injection as regards to the hip as a source of her pain.  She clearly also has some symptoms of lumbar radiculopathy.  I do think physical therapy will be helpful.  I have also recommended an MRI of her lumbar spine to see if there is any stenosis that may be contributing or causing her pain.  This has been ordered.  If there are areas concerning and consistent with her symptoms then we will refer for targeted injections.  All questions answered, patient in agreement with plan.

## 2025-01-23 ENCOUNTER — EVALUATION (OUTPATIENT)
Dept: PHYSICAL THERAPY | Facility: CLINIC | Age: 40
End: 2025-01-23
Payer: COMMERCIAL

## 2025-01-23 DIAGNOSIS — S76.912D STRAIN OF HIP AND THIGH, LEFT, SUBSEQUENT ENCOUNTER: ICD-10-CM

## 2025-01-23 DIAGNOSIS — S76.012D STRAIN OF HIP AND THIGH, LEFT, SUBSEQUENT ENCOUNTER: ICD-10-CM

## 2025-01-23 PROCEDURE — 97110 THERAPEUTIC EXERCISES: CPT | Mod: GP | Performed by: PHYSICAL THERAPIST

## 2025-01-23 PROCEDURE — 97161 PT EVAL LOW COMPLEX 20 MIN: CPT | Mod: GP | Performed by: PHYSICAL THERAPIST

## 2025-01-23 PROCEDURE — RXMED WILLOW AMBULATORY MEDICATION CHARGE

## 2025-01-23 PROCEDURE — 97140 MANUAL THERAPY 1/> REGIONS: CPT | Mod: GP | Performed by: PHYSICAL THERAPIST

## 2025-01-23 NOTE — PROGRESS NOTES
Physical Therapy  Physical Therapy Orthopedic Evaluation    Patient Name: Kitty Dunbar  MRN: [id number]  Today's Date: 1/23/2025  Time Calculation  Start Time: 1037  Stop Time: 1120  Time Calculation (min): 43 min    PT Evaluation Time Entry  PT Evaluation (Low) Time Entry: 15  PT Therapeutic Procedures Time Entry  Manual Therapy Time Entry: 13  Therapeutic Exercise Time Entry: 10       Insurance:  Visit number: 1  Authorization info: Auth req  Insurance Type: Payor:  EMPLOYEE MEDICAL PLAN / Plan:  EMPLOYEE MEDICAL PLAN TRADITIONAL  / Product Type: *No Product type* /      Current Problem     Problem List Items Addressed This Visit             ICD-10-CM    Strain of left hip S76.012A       Surgery:    Referred by: David Ragsdale MD    Precautions:       Subjective:   Subjective   Chief Complaint: L hip pain  Onset: 12/10/24  MARGARITO: she was stepping down from a stepstool, stepping into a raised dog bowel with her left foot, twisted on the leg and fell backwards and landed on her backside     General:   pain in the anterior hip and groin and wraps down into the medial aspect of her leg and into the knee. That she is also having some numbness and tingling down the leg   Current Condition:   Samecan't stand for meaningful time, or go up stairs. Cannot find a comfortable position    Pain:     Location: L hip and down the leg, groin pain   Rating: Best-5, Current-5, Worst-8  Description: numbness, sharp, achy   Aggravating Factors:  stairs, standing, Hip rotation  Relieving Factors:  None    Relevant Information (PMH & Previous Tests/Imaging): Cam lesion noted. No fracture or dislocation.   MRI independently reviewed, nonspecific small effusions and bilateral hip irregularity along anterior superior labrum which could represent tear    Previous Interventions/Treatments: Injections    Prior Level of Function (PLOF)  Patient previously independent with all ADLs  Exercise/Physical Activity: 4 kids   Work/School:  "Respiratory Therapist     Patients Living Environment: Reviewed and no concern  Primary Language: English  Patient's Goal(s) for Therapy: reduce L hip pain     Red Flags: Do you have any of the following? No  Fever/chills, unexplained weight changes, dizziness/fainting, unexplained change in bowel or bladder functions, unexplained malaise or muscle weakness, night pain/sweats, numbness or tingling    Objective:  Objective     Palpation/Observation  TTP anterior hip flexor, ASIS  Posture  Mild lumbar lordosis, flexed at the hip slightly standing position  Seated posture, she sits with L leg straight, hip slightly rotated externally   Special Testing  + hip scour, log roll, C sign, SLR, SWEETIE, FADIR  -sacral rock  ROM  1/23/2025  Trunk  Flexion 30% hawkins pain  Ext 25% hawkins pain  L rot 25% hawkins with pain  R Rot WNL    Hip flexion AROM/PROM to 90, abd 25deg, ADD full, IR 25deg pain, ER 20deg pain  Log roll IR/ER pain at 25deg    Secondary to pain difficult to assess full mobility    No restriction at the knee or the R LE  Strength  1/23/2025  L hip flex 3/5 with pain, abd 3/5, ADD 3/5, motion of this hip causes significant pain symptoms  Sensation  Moderate L Leg low back to foot radic  Reflexes      Transfers: analgic with alicia UE assist  Gait: mild antalgia, no assistive device   SL Balance:   DL Squat: sit to stand is very antalgic with patient using UE assist  SL Squat:      Outcome Measures:  Other Measures  Lower Extremity Funtional Score (LEFS): 20/80   1/23/2025  Treatments:  Ther-EX:  Hip ABD and ADD isometrics x 10 5\"  Post pelvic tilt x 10  Reviewed HEP  Manual:  Lateral distraction, long axis distraction, neither were tolerated after 4-5 minutes each      PT Evaluation Time Entry  PT Evaluation (Low) Time Entry: 15  PT Therapeutic Procedures Time Entry  Manual Therapy Time Entry: 13  Therapeutic Exercise Time Entry: 10       EDUCATION: Home exercise program, plan of care, activity modifications, pain management, " and injury pathology     Code: [id number]     Medical History Form: Reviewed (scanned into chart)  Reviewed medical form, Key [place]cher, Falls risk, Buddhism beliefs, PHQ     Screening  Frequency  Date Last Completed   Spiritual and Cultural Beliefs   Screening each visit or episode of care 1/14/2025   Falls Risk Screening every ambulatory visit    Pain Screening annually at primary care visit  1/14/2025   Domestic Violence screening annually at primary care visit 1/14/2025   Depression Screening annually in the primary care setting 1/14/2025   Suicide Risk Screening annually in the primary care setting 12/17/2024   Nutrition and Food Insecurity   Screening at least annually at primary care visit     Key Learner annually in the primary care setting 1/14/2025   Drug Screen  1/14/2025  1:25 PM   Alcohol Screen  1/14/2025  1:24 PM   Advance Directive  1/14/2025     Time Calculation  Start Time: 1037  Stop Time: 1120  Time Calculation (min): 43 min  PT Evaluation Time Entry  PT Evaluation (Low) Time Entry: 15 PT Therapeutic Procedures Time Entry  Manual Therapy Time Entry: 13  Therapeutic Exercise Time Entry: 10          Assessment: Patient presents with signs and symptoms consistent with L hip MARIELLA, resulting in limited participation in pain-free ADLs and inability to perform at their prior level of function. Pt would benefit from physical therapy to address the impairments found & listed previously in the objective section in order to return to safe and pain-free ADLs and prior level of function.     Complexity:Low  Prognosis: fair  Response to care: fair  Clinical Presentation: Stable and/or uncomplicated characteristics  Personal Factors: Comorbidities- low back    Problem List:  Pain  Function  Mobility  Strength  Plan:     Planned Interventions include: therapeutic exercise, self-care home management, manual therapy, therapeutic activities, gait training, neuromuscular coordination, vasopneumatic, dry needling,  aquatic therapy    Next Treatment:Manual STM hip flexor and adductor  Frequency: 1-2 x Week  Duration: 8 Weeks  Goals: Set and discussed today  4 weeks Patient to have pain less than 6/10 for improved sitting/standing/walking  Patient to be independent with HEP and progression for improved carryover  Improved ability to perform walking greater than 5-10 minutes for improved function    8 weeks   Patient to have improved LEFS score for improved function at home  Patient to have pain less than 5/10 for improved ADL performance  Improved Hip flexion 110deg for better function with daily activities  ROM Hip ER 45  improved to for participation in home and work activity     Plan of care was developed with input and agreement by the patient      Omero Greene, PT

## 2025-01-24 DIAGNOSIS — M25.552 LEFT HIP PAIN: ICD-10-CM

## 2025-01-24 RX ORDER — MELOXICAM 15 MG/1
15 TABLET ORAL DAILY PRN
Qty: 30 TABLET | Refills: 0 | Status: SHIPPED | OUTPATIENT
Start: 2025-01-24 | End: 2025-02-23

## 2025-01-27 ENCOUNTER — APPOINTMENT (OUTPATIENT)
Dept: ORTHOPEDIC SURGERY | Facility: CLINIC | Age: 40
End: 2025-01-27
Payer: COMMERCIAL

## 2025-01-28 ENCOUNTER — OFFICE VISIT (OUTPATIENT)
Dept: ORTHOPEDIC SURGERY | Facility: HOSPITAL | Age: 40
End: 2025-01-28
Payer: COMMERCIAL

## 2025-01-28 DIAGNOSIS — M25.852 FEMOROACETABULAR IMPINGEMENT OF LEFT HIP: ICD-10-CM

## 2025-01-28 PROBLEM — F41.0 PANIC ATTACKS: Status: ACTIVE | Noted: 2025-01-28

## 2025-01-28 PROCEDURE — 99213 OFFICE O/P EST LOW 20 MIN: CPT | Performed by: STUDENT IN AN ORGANIZED HEALTH CARE EDUCATION/TRAINING PROGRAM

## 2025-01-28 PROCEDURE — RXMED WILLOW AMBULATORY MEDICATION CHARGE

## 2025-01-28 RX ORDER — METHYLPREDNISOLONE 4 MG/1
TABLET ORAL
Qty: 21 TABLET | Refills: 0 | Status: SHIPPED | OUTPATIENT
Start: 2025-01-28 | End: 2025-02-03

## 2025-01-28 NOTE — PROGRESS NOTES
Kitty Dunbar  is a 39 y.o. year-old  female. she returns regarding her hip.  She is going in for a few sessions in therapy and feels like that has made it worse.  She is really struggling with pain.  At 1 point she notes she was taking meloxicam every 4 hours in discussion with her she misread the label and interpreted the 4-6 for pain levels as every 4-6 hours.  I did  her on this that she cannot take it that often and in fact have recommended she stop.  She still getting some pain that radiates down her leg and numbness in her foot.  Also quite a bit of groin pain.  She is able to walk and go put weight on it.  She is not having any fevers or chills warmth or redness around the hip.  Also having spasms in the leg.     Past Medical, Family, and Social History reviewed and inlcude:[none] all other history pertinent to the presenting problem  Review of Systems  A complete review of systems was conducted, pertinent only to the HPI noted above.  Constitutional: None  Eyes: No additions to above history  Ears, Nose, Throat: No additions to above history  Cardiovascular: No additions to above history  Respiratory: No additions to above history  GI: No additions to above history  : No additions to above history  Skin/Neuro: No additions to above history  Endocrine/Heme/Lymph: No additions to above history  Immunologic: No additions to above history  Psychiatric: No additions to above history  Musculoskeletal: see above  Eyes: sclera anicteric  ENT: hearing appropriate for normal conversation, neck appears symmetric with no gross thyromegaly  Pulm: No labored breathing, no wheezing  CVS: Regular rate and rhythm  Abd: Non-distended  Skin: No rashes, erythema, or induration around hip    MUSCULOSKELETAL EXAM: HIP      Left HIP:     IR@60: [20] degrees with pain   ER@60: [40] degrees with pain   Did not attempt FADIR as last time this caused increased pain.  She can tolerate internal and external rotation  passively without pain.  Can flex the hip to 90 beyond that causes pain in the groin.  Mild tenderness over the lateral aspect of her hip positive Stinchfield      Neurovascularly Intact to Femroal/obturator/LFCN/S/S/SPN/DPN/T nerves on bilateral extremities    Xrays independently interpreted and  reviewed: femoral head well seated within acetabulum. Cam lesion noted. No fracture or dislocation.   MRI independently reviewed, nonspecific small effusions and bilateral hip irregularity along anterior superior labrum which could represent tear    The patient history, physical examination and imaging studies are consistent with the diagnosis of [femoroacetabular impingement (MARIELLA)] lumbar radiculopathy.     PLAN:  I reviewed with the patient that I do think she has a significant component of lumbar radiculopathy with this.  I see no concern for a septic joint at this time.  I did recommend she get the MRI of her lumbar spine which is scheduled for next week and she is going to meet with pain management after that.  We also discussed stopping meloxicam as she was taking way too much.  I prescribed her a Medrol Dosepak to help with her symptoms.  Hopefully she responds to this we will see how she does with continued physical therapy and after her pain management appointment..  All questions answered, patient in agreement with plan.

## 2025-01-29 ENCOUNTER — APPOINTMENT (OUTPATIENT)
Dept: PHYSICAL THERAPY | Facility: CLINIC | Age: 40
End: 2025-01-29
Payer: COMMERCIAL

## 2025-01-29 DIAGNOSIS — S76.912D STRAIN OF HIP AND THIGH, LEFT, SUBSEQUENT ENCOUNTER: ICD-10-CM

## 2025-01-29 DIAGNOSIS — S76.012D STRAIN OF HIP AND THIGH, LEFT, SUBSEQUENT ENCOUNTER: ICD-10-CM

## 2025-02-03 ENCOUNTER — APPOINTMENT (OUTPATIENT)
Dept: PHYSICAL THERAPY | Facility: CLINIC | Age: 40
End: 2025-02-03
Payer: COMMERCIAL

## 2025-02-03 DIAGNOSIS — S76.012D STRAIN OF HIP AND THIGH, LEFT, SUBSEQUENT ENCOUNTER: ICD-10-CM

## 2025-02-03 DIAGNOSIS — S76.912D STRAIN OF HIP AND THIGH, LEFT, SUBSEQUENT ENCOUNTER: ICD-10-CM

## 2025-02-04 ENCOUNTER — APPOINTMENT (OUTPATIENT)
Dept: PHYSICAL THERAPY | Facility: CLINIC | Age: 40
End: 2025-02-04
Payer: COMMERCIAL

## 2025-02-04 ENCOUNTER — PHARMACY VISIT (OUTPATIENT)
Dept: PHARMACY | Facility: CLINIC | Age: 40
End: 2025-02-04
Payer: COMMERCIAL

## 2025-02-04 ENCOUNTER — APPOINTMENT (OUTPATIENT)
Dept: RADIOLOGY | Facility: CLINIC | Age: 40
End: 2025-02-04
Payer: COMMERCIAL

## 2025-02-04 ENCOUNTER — APPOINTMENT (OUTPATIENT)
Dept: ORTHOPEDIC SURGERY | Facility: HOSPITAL | Age: 40
End: 2025-02-04
Payer: COMMERCIAL

## 2025-02-04 DIAGNOSIS — M79.672 LEFT FOOT PAIN: Primary | ICD-10-CM

## 2025-02-05 ENCOUNTER — HOSPITAL ENCOUNTER (OUTPATIENT)
Dept: RADIOLOGY | Facility: CLINIC | Age: 40
Discharge: HOME | End: 2025-02-05
Payer: COMMERCIAL

## 2025-02-05 DIAGNOSIS — M54.16 LUMBAR RADICULOPATHY: ICD-10-CM

## 2025-02-05 PROCEDURE — 72148 MRI LUMBAR SPINE W/O DYE: CPT

## 2025-02-06 ENCOUNTER — OFFICE VISIT (OUTPATIENT)
Dept: PAIN MEDICINE | Facility: CLINIC | Age: 40
End: 2025-02-06
Payer: COMMERCIAL

## 2025-02-06 ENCOUNTER — TREATMENT (OUTPATIENT)
Dept: PHYSICAL THERAPY | Facility: CLINIC | Age: 40
End: 2025-02-06
Payer: COMMERCIAL

## 2025-02-06 VITALS
RESPIRATION RATE: 16 BRPM | DIASTOLIC BLOOD PRESSURE: 72 MMHG | HEART RATE: 83 BPM | HEIGHT: 64 IN | OXYGEN SATURATION: 98 % | SYSTOLIC BLOOD PRESSURE: 112 MMHG | WEIGHT: 167 LBS | BODY MASS INDEX: 28.51 KG/M2

## 2025-02-06 DIAGNOSIS — M25.552 LEFT HIP PAIN: ICD-10-CM

## 2025-02-06 DIAGNOSIS — M51.369 ANNULAR TEAR OF LUMBAR DISC: Primary | ICD-10-CM

## 2025-02-06 DIAGNOSIS — S76.012D STRAIN OF HIP AND THIGH, LEFT, SUBSEQUENT ENCOUNTER: ICD-10-CM

## 2025-02-06 DIAGNOSIS — M54.16 LUMBAR RADICULOPATHY: ICD-10-CM

## 2025-02-06 DIAGNOSIS — S76.912D STRAIN OF HIP AND THIGH, LEFT, SUBSEQUENT ENCOUNTER: ICD-10-CM

## 2025-02-06 PROCEDURE — 3008F BODY MASS INDEX DOCD: CPT | Performed by: ANESTHESIOLOGY

## 2025-02-06 PROCEDURE — 97140 MANUAL THERAPY 1/> REGIONS: CPT | Mod: GP | Performed by: PHYSICAL THERAPIST

## 2025-02-06 PROCEDURE — 99204 OFFICE O/P NEW MOD 45 MIN: CPT | Performed by: ANESTHESIOLOGY

## 2025-02-06 PROCEDURE — 99214 OFFICE O/P EST MOD 30 MIN: CPT | Performed by: ANESTHESIOLOGY

## 2025-02-06 PROCEDURE — 1036F TOBACCO NON-USER: CPT | Performed by: ANESTHESIOLOGY

## 2025-02-06 PROCEDURE — RXMED WILLOW AMBULATORY MEDICATION CHARGE

## 2025-02-06 RX ORDER — GABAPENTIN 300 MG/1
300 CAPSULE ORAL 2 TIMES DAILY
Qty: 60 CAPSULE | Refills: 1 | Status: SHIPPED | OUTPATIENT
Start: 2025-02-06 | End: 2025-03-08

## 2025-02-06 RX ORDER — ETODOLAC 400 MG/1
400 TABLET, FILM COATED ORAL 2 TIMES DAILY
Qty: 30 TABLET | Refills: 0 | Status: SHIPPED | OUTPATIENT
Start: 2025-02-06 | End: 2025-02-22

## 2025-02-06 ASSESSMENT — ENCOUNTER SYMPTOMS
BACK PAIN: 1
CARDIOVASCULAR NEGATIVE: 1
GASTROINTESTINAL NEGATIVE: 1
HEMATOLOGIC/LYMPHATIC NEGATIVE: 1
DEPRESSION: 0
RESPIRATORY NEGATIVE: 1
EYES NEGATIVE: 1
LOSS OF SENSATION IN FEET: 1
ARTHRALGIAS: 1
OCCASIONAL FEELINGS OF UNSTEADINESS: 1
NUMBNESS: 1
CONSTITUTIONAL NEGATIVE: 1
ALLERGIC/IMMUNOLOGIC NEGATIVE: 1
PSYCHIATRIC NEGATIVE: 1
ENDOCRINE NEGATIVE: 1

## 2025-02-06 ASSESSMENT — PAIN - FUNCTIONAL ASSESSMENT: PAIN_FUNCTIONAL_ASSESSMENT: 0-10

## 2025-02-06 ASSESSMENT — PAIN SCALES - GENERAL
PAINLEVEL_OUTOF10: 6
PAINLEVEL_OUTOF10: 6

## 2025-02-06 ASSESSMENT — PATIENT HEALTH QUESTIONNAIRE - PHQ9
SUM OF ALL RESPONSES TO PHQ9 QUESTIONS 1 AND 2: 0
1. LITTLE INTEREST OR PLEASURE IN DOING THINGS: NOT AT ALL
2. FEELING DOWN, DEPRESSED OR HOPELESS: NOT AT ALL

## 2025-02-06 ASSESSMENT — LIFESTYLE VARIABLES: TOTAL SCORE: 5

## 2025-02-06 NOTE — PROGRESS NOTES
Physical Therapy  Physical Therapy Treatment Note    Patient Name: Kitty Dunbar  MRN: [id number]  Today's Date: 2/6/2025  Time Calculation  Start Time: 1155  Stop Time: 1240  Time Calculation (min): 45 min       PT Therapeutic Procedures Time Entry  Manual Therapy Time Entry: 25     Referring:David Ragsdale MD     Insurance:  Visit number: 2/15    Authorization info: 1/23/25-4/30/25  Insurance Type: Payor:  EMPLOYEE MEDICAL PLAN / Plan:  EMPLOYEE MEDICAL PLAN TRADITIONAL  / Product Type: *No Product type* /     Current Problem     Problem List Items Addressed This Visit             ICD-10-CM    Strain of left hip S76.012A       General   Chief Complaint: L hip pain  Onset: 12/10/24  MARGARITO: she was stepping down from a stepstool, stepping into a raised dog bowel with her left foot, twisted on the leg and fell backwards and landed on her backside     Precautions       Subjective:   Subjective   Patient reports her hip is worse, more spasms, pain after exercise. Receiving a second opinion  HEP compliance: YES  Pain     Objective:   Objective   Lower Extremity Funtional Score (LEFS): 20/80   1/23/2025    Cam lesion noted. No fracture or dislocation.   MRI independently reviewed, nonspecific small effusions and bilateral hip irregularity along anterior superior labrum which could represent tear    There is multilevel lumbar spondylosis most pronounced at the L4/5   ROM  1/23/2025  Trunk  Flexion 30% hawkins pain  Ext 25% hawkins pain  L rot 25% hawkins with pain  R Rot WNL     Hip flexion AROM/PROM to 90, abd 25deg, ADD full, IR 25deg pain, ER 20deg pain  Log roll IR/ER pain at 25deg     Secondary to pain difficult to assess full mobility     No restriction at the knee or the R LE  Strength  1/23/2025  L hip flex 3/5 with pain, abd 3/5, ADD 3/5, motion of this hip causes significant pain symptoms    Treatments:   Ther Ex  Unable to perform  Manual  STM anterior hip flexion and lumbar parspinals L4/L5    Modalities         PT  Therapeutic Procedures Time Entry  Manual Therapy Time Entry: 25     HEP Access Code:[email or web address]   Assessment:  Pain is still severe, moderate spasms and tremors during treatment. Limited exercise tolerance secondary to L hip pain. She does have mild low back pain and discomfort, however isolated hip exercises provide increased L hip pain     Plan: hold off on therapy until second opinion     Goals:   4 weeks Patient to have pain less than 6/10 for improved sitting/standing/walking  Patient to be independent with HEP and progression for improved carryover  Improved ability to perform walking greater than 5-10 minutes for improved function     8 weeks   Patient to have improved LEFS score for improved function at home  Patient to have pain less than 5/10 for improved ADL performance  Improved Hip flexion 110deg for better function with daily activities  ROM Hip ER 45  improved to for participation in home and work activity     Omero Greene, PT
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
## 2025-02-07 ENCOUNTER — APPOINTMENT (OUTPATIENT)
Dept: PHYSICAL THERAPY | Facility: CLINIC | Age: 40
End: 2025-02-07
Payer: COMMERCIAL

## 2025-02-07 DIAGNOSIS — S76.912D STRAIN OF HIP AND THIGH, LEFT, SUBSEQUENT ENCOUNTER: ICD-10-CM

## 2025-02-07 DIAGNOSIS — S76.012D STRAIN OF HIP AND THIGH, LEFT, SUBSEQUENT ENCOUNTER: ICD-10-CM

## 2025-02-10 ENCOUNTER — APPOINTMENT (OUTPATIENT)
Dept: PHYSICAL THERAPY | Facility: CLINIC | Age: 40
End: 2025-02-10
Payer: COMMERCIAL

## 2025-02-10 DIAGNOSIS — S76.912D STRAIN OF HIP AND THIGH, LEFT, SUBSEQUENT ENCOUNTER: ICD-10-CM

## 2025-02-10 DIAGNOSIS — S76.012D STRAIN OF HIP AND THIGH, LEFT, SUBSEQUENT ENCOUNTER: ICD-10-CM

## 2025-02-11 ENCOUNTER — APPOINTMENT (OUTPATIENT)
Dept: RADIOLOGY | Facility: CLINIC | Age: 40
End: 2025-02-11
Payer: COMMERCIAL

## 2025-02-12 ENCOUNTER — APPOINTMENT (OUTPATIENT)
Dept: PHYSICAL THERAPY | Facility: CLINIC | Age: 40
End: 2025-02-12
Payer: COMMERCIAL

## 2025-02-12 DIAGNOSIS — S76.012D STRAIN OF HIP AND THIGH, LEFT, SUBSEQUENT ENCOUNTER: ICD-10-CM

## 2025-02-12 DIAGNOSIS — S76.912D STRAIN OF HIP AND THIGH, LEFT, SUBSEQUENT ENCOUNTER: ICD-10-CM

## 2025-02-19 ENCOUNTER — APPOINTMENT (OUTPATIENT)
Dept: PHYSICAL THERAPY | Facility: CLINIC | Age: 40
End: 2025-02-19
Payer: COMMERCIAL

## 2025-02-19 DIAGNOSIS — S76.912D STRAIN OF HIP AND THIGH, LEFT, SUBSEQUENT ENCOUNTER: ICD-10-CM

## 2025-02-19 DIAGNOSIS — S76.012D STRAIN OF HIP AND THIGH, LEFT, SUBSEQUENT ENCOUNTER: ICD-10-CM

## 2025-02-20 PROCEDURE — RXMED WILLOW AMBULATORY MEDICATION CHARGE

## 2025-02-26 ENCOUNTER — PHARMACY VISIT (OUTPATIENT)
Dept: PHARMACY | Facility: CLINIC | Age: 40
End: 2025-02-26
Payer: COMMERCIAL

## 2025-02-26 ENCOUNTER — APPOINTMENT (OUTPATIENT)
Dept: PHYSICAL THERAPY | Facility: CLINIC | Age: 40
End: 2025-02-26
Payer: COMMERCIAL

## 2025-02-26 DIAGNOSIS — S76.912D STRAIN OF HIP AND THIGH, LEFT, SUBSEQUENT ENCOUNTER: ICD-10-CM

## 2025-02-26 DIAGNOSIS — S76.012D STRAIN OF HIP AND THIGH, LEFT, SUBSEQUENT ENCOUNTER: ICD-10-CM

## 2025-02-26 RX ORDER — VILAZODONE HYDROCHLORIDE 40 MG/1
80 TABLET ORAL DAILY
Qty: 60 TABLET | Refills: 0 | OUTPATIENT
Start: 2025-02-26

## 2025-02-28 ENCOUNTER — APPOINTMENT (OUTPATIENT)
Dept: PHYSICAL THERAPY | Facility: CLINIC | Age: 40
End: 2025-02-28
Payer: COMMERCIAL

## 2025-02-28 DIAGNOSIS — S76.912D STRAIN OF HIP AND THIGH, LEFT, SUBSEQUENT ENCOUNTER: ICD-10-CM

## 2025-02-28 DIAGNOSIS — S76.012D STRAIN OF HIP AND THIGH, LEFT, SUBSEQUENT ENCOUNTER: ICD-10-CM

## 2025-03-19 RX ORDER — LIDOCAINE HYDROCHLORIDE 10 MG/ML
2 INJECTION, SOLUTION INFILTRATION; PERINEURAL
Status: COMPLETED | OUTPATIENT
Start: 2025-01-06 | End: 2025-01-06

## 2025-03-19 RX ORDER — METHYLPREDNISOLONE ACETATE 40 MG/ML
80 INJECTION, SUSPENSION INTRA-ARTICULAR; INTRALESIONAL; INTRAMUSCULAR; SOFT TISSUE
Status: COMPLETED | OUTPATIENT
Start: 2025-01-06 | End: 2025-01-06

## 2025-03-19 RX ORDER — ROPIVACAINE HYDROCHLORIDE 5 MG/ML
2 INJECTION, SOLUTION EPIDURAL; INFILTRATION; PERINEURAL
Status: COMPLETED | OUTPATIENT
Start: 2025-01-06 | End: 2025-01-06

## 2025-03-21 PROCEDURE — RXMED WILLOW AMBULATORY MEDICATION CHARGE

## 2025-03-24 PROCEDURE — RXMED WILLOW AMBULATORY MEDICATION CHARGE

## 2025-03-25 ENCOUNTER — HOSPITAL ENCOUNTER (OUTPATIENT)
Dept: RADIOLOGY | Facility: HOSPITAL | Age: 40
Discharge: HOME | End: 2025-03-25
Payer: COMMERCIAL

## 2025-03-25 DIAGNOSIS — M25.552 PAIN IN LEFT HIP: ICD-10-CM

## 2025-03-25 PROCEDURE — 27093 INJECTION FOR HIP X-RAY: CPT | Mod: LEFT SIDE | Performed by: RADIOLOGY

## 2025-03-25 PROCEDURE — 73525 CONTRAST X-RAY OF HIP: CPT | Mod: LT

## 2025-03-25 PROCEDURE — 27093 INJECTION FOR HIP X-RAY: CPT | Mod: LT

## 2025-03-25 PROCEDURE — A9575 INJ GADOTERATE MEGLUMI 0.1ML: HCPCS | Performed by: ORTHOPAEDIC SURGERY

## 2025-03-25 PROCEDURE — 77002 NEEDLE LOCALIZATION BY XRAY: CPT | Mod: LEFT SIDE | Performed by: RADIOLOGY

## 2025-03-25 PROCEDURE — 2500000004 HC RX 250 GENERAL PHARMACY W/ HCPCS (ALT 636 FOR OP/ED): Performed by: RADIOLOGY

## 2025-03-25 PROCEDURE — 2550000001 HC RX 255 CONTRASTS: Performed by: ORTHOPAEDIC SURGERY

## 2025-03-25 PROCEDURE — 2500000005 HC RX 250 GENERAL PHARMACY W/O HCPCS: Performed by: RADIOLOGY

## 2025-03-25 RX ORDER — GADOTERATE MEGLUMINE 376.9 MG/ML
0.1 INJECTION INTRAVENOUS
Status: COMPLETED | OUTPATIENT
Start: 2025-03-25 | End: 2025-03-25

## 2025-03-25 RX ORDER — LIDOCAINE HYDROCHLORIDE 20 MG/ML
20 INJECTION, SOLUTION INFILTRATION; PERINEURAL ONCE
Status: COMPLETED | OUTPATIENT
Start: 2025-03-25 | End: 2025-03-25

## 2025-03-25 RX ORDER — SODIUM CHLORIDE 9 MG/ML
20 INJECTION INTRAMUSCULAR; INTRAVENOUS; SUBCUTANEOUS
Status: COMPLETED | OUTPATIENT
Start: 2025-03-25 | End: 2025-03-25

## 2025-03-25 RX ADMIN — LIDOCAINE HYDROCHLORIDE 3 ML: 20 INJECTION, SOLUTION INFILTRATION; PERINEURAL at 13:30

## 2025-03-25 RX ADMIN — GADOTERATE MEGLUMINE 0.1 ML: 376.9 INJECTION INTRAVENOUS at 13:29

## 2025-03-25 RX ADMIN — IOHEXOL 3 ML: 300 INJECTION, SOLUTION INTRAVENOUS at 13:20

## 2025-03-25 RX ADMIN — SODIUM CHLORIDE 3 ML: 9 INJECTION, SOLUTION INTRAMUSCULAR; INTRAVENOUS; SUBCUTANEOUS at 13:20

## 2025-03-29 ENCOUNTER — PHARMACY VISIT (OUTPATIENT)
Dept: PHARMACY | Facility: CLINIC | Age: 40
End: 2025-03-29
Payer: COMMERCIAL

## 2025-03-31 ENCOUNTER — APPOINTMENT (OUTPATIENT)
Dept: RADIOLOGY | Facility: HOSPITAL | Age: 40
End: 2025-03-31
Payer: COMMERCIAL

## 2025-04-01 NOTE — PROGRESS NOTES
"[facility]  New Visit Note        HISTORY OF PRESENT ILLNESS       HISTORY OF PRESENT ILLNESS   Kitty Dunbar is a 40 y.o. female who presents to a [facility] for a sleep medicine evaluation with concerns of Consult, Difficulties Falling Asleep, and Difficulties Staying Asleep.     PAST SLEEP HISTORY    Patient has the following sleep study results: no past SS    CURRENT HISTORY    She reports significant difficulty falling asleep due to anxiety and minor aches, and difficulty staying asleep, waking up multiple times per night. She believes her anxiety is the primary cause of her insomnia.    Her sleep is unrefreshing, and she experiences excessive daytime tiredness. She does not snore and does not wake gasping or choking.    She states her mother and sister both have bad anxiety and insomnia, and her father snores loudly.     Severe insomnia since her 20s, progressively worsening in the last year and significantly since October.    For sleep she tried: Remeron (no benefit), Trazodone and Ambien (allergic reactions), Seroquel (worked but caused weight gain), Klonopin (helped briefly), Doxylamine, Hydroxyzine, Benadryl, Magnesium, Melatonin, and Arley Eagle (misinterpreted, no benefit).    Behavioral therapies: CBT, meditation, white noise did not help    Other medications:  Currently on BuSpar (misinterpreted as \"bucephora\"), Viibrid for depression, Adderall for ADHD and OCD, Xanaflex (misinterpreted as \"Xanaflex\") and Gabapentin (misinterpreted as \"Gabapen\") for pain.    STOP BANG:  S    SLEEP RELATED-ROS:  SLEEP SCHEDULE WEEKDAYS/WORKDAYS  Usual Bedtime 10 p.m.  Falls asleep around 5 a.m.  Wake time 6 a.m.    SLEEP SCHEDULE WEEKENDS/NON-WORKDAYS:  Same    NAPS: None    AVERAGE SLEEP DURATION 0-2 hours/day    PREFERRED SLEEP POSITION: Side    SLEEP INITIATION: Difficulty falling asleep due to anxiety and minor aches.    SLEEP MAINTENANCE: Wakes up " "multiple times per night.    RECREATIONAL DRUG USE  SMOKING: Quit smoking in 2017.  ALCOHOL: Drinks alcohol about two times per week.  CAFFEINE: Drinks coffee occasionally.  MARIJUANA: Previously used, not currently.    ESS: 0      PHYSICAL EXAM     VITAL SIGNS: BP (!) 136/94   Pulse 98   Ht 1.626 m (5' 4\")   Wt 74.8 kg (165 lb)   SpO2 100%   BMI 28.32 kg/m²      PREVIOUS WEIGHTS:  Wt Readings from Last 3 Encounters:   04/04/25 74.8 kg (165 lb)   04/03/25 68 kg (150 lb)   02/06/25 75.8 kg (167 lb)       PHYSICAL EXAM: GENERAL: alert oriented x 3 pleasant and cooperative no acute distress  MODIFIED MALLAMPATI SCORE: 3+  LATERAL PHARYNGEAL WALL: 3+  NECK EXAM: normal supple no adenopathy    RESULTS/DATA     No results found for: \"IRON\", \"TRANSFERRIN\", \"IRONSAT\", \"TIBC\", \"FERRITIN\"    ASSESSMENT/PLAN     Ms. Dunbar is a 40 y.o. female and was referred to the [facility] for the following issues:    CHRONIC INSOMNIA / ANXIETY & DEPRESSION  -wait until 1-2 am before getting into bed, only get into bed if sleepy  -don't nap, use bed only for sleep  -provided sleep logs to fill out  -start lexapro 5 mg for anxiety (she understands will take at least 1-2 weeks to start helping)  -recommend follow up with PCP/psych for anxiety    OBSTRUCTIVE SLEEP APNEA / FREQUENT WAKING  -Ordering sleep study to evaluate  -she declined medication to help her sleep during sleep study    Followup 3 weeks after sleep study to review results   "

## 2025-04-03 ENCOUNTER — APPOINTMENT (OUTPATIENT)
Dept: RADIOLOGY | Facility: HOSPITAL | Age: 40
End: 2025-04-03
Payer: COMMERCIAL

## 2025-04-03 ENCOUNTER — HOSPITAL ENCOUNTER (EMERGENCY)
Facility: HOSPITAL | Age: 40
Discharge: HOME | End: 2025-04-03
Payer: COMMERCIAL

## 2025-04-03 VITALS
RESPIRATION RATE: 18 BRPM | DIASTOLIC BLOOD PRESSURE: 93 MMHG | HEIGHT: 64 IN | OXYGEN SATURATION: 98 % | WEIGHT: 150 LBS | TEMPERATURE: 98.4 F | SYSTOLIC BLOOD PRESSURE: 138 MMHG | BODY MASS INDEX: 25.61 KG/M2 | HEART RATE: 93 BPM

## 2025-04-03 DIAGNOSIS — S60.221A CONTUSION OF DORSUM OF RIGHT HAND: Primary | ICD-10-CM

## 2025-04-03 PROCEDURE — 29125 APPL SHORT ARM SPLINT STATIC: CPT | Mod: RT

## 2025-04-03 PROCEDURE — 99283 EMERGENCY DEPT VISIT LOW MDM: CPT

## 2025-04-03 PROCEDURE — 73130 X-RAY EXAM OF HAND: CPT | Mod: RT

## 2025-04-03 PROCEDURE — 73130 X-RAY EXAM OF HAND: CPT | Mod: RIGHT SIDE | Performed by: RADIOLOGY

## 2025-04-03 RX ORDER — OXYCODONE AND ACETAMINOPHEN 5; 325 MG/1; MG/1
1 TABLET ORAL ONCE
Status: DISCONTINUED | OUTPATIENT
Start: 2025-04-03 | End: 2025-04-03

## 2025-04-03 ASSESSMENT — COLUMBIA-SUICIDE SEVERITY RATING SCALE - C-SSRS
1. IN THE PAST MONTH, HAVE YOU WISHED YOU WERE DEAD OR WISHED YOU COULD GO TO SLEEP AND NOT WAKE UP?: NO
6. HAVE YOU EVER DONE ANYTHING, STARTED TO DO ANYTHING, OR PREPARED TO DO ANYTHING TO END YOUR LIFE?: NO
2. HAVE YOU ACTUALLY HAD ANY THOUGHTS OF KILLING YOURSELF?: NO

## 2025-04-03 NOTE — ED PROVIDER NOTES
HPI   Chief Complaint   Patient presents with    Hand Pain     Pt hand got riht hand stuck between door and wall       HPI  Patient is a 39-year-old female presenting for evaluation of right hand pain.  Patient states that this morning her son tripped into a door and the door smashed her hand into the wall.  She does have a bruise to her right dorsal hand near the thumb and endorses pain with manipulation of the thumb.  She does have a bruise at this area.  She denies any numbness or tingling of the extremity.  Denies injury otherwise.  She is right-hand dominant.      Patient History   Past Medical History:   Diagnosis Date    Cervical disc herniation 2006    Kidney stones     Lumbar disc herniation 2006    Migraine      Past Surgical History:   Procedure Laterality Date    BREAST SURGERY  2015     SECTION, CLASSIC  2008    DILATION AND CURETTAGE OF UTERUS       No family history on file.  Social History     Tobacco Use    Smoking status: Never    Smokeless tobacco: Never   Vaping Use    Vaping status: Never Used   Substance Use Topics    Alcohol use: Yes     Comment: occasional    Drug use: Never       Physical Exam   ED Triage Vitals [25 1701]   Temperature Heart Rate Respirations BP   36.9 °C (98.4 °F) 93 18 (!) 138/93      Pulse Ox Temp Source Heart Rate Source Patient Position   98 % Temporal -- Sitting      BP Location FiO2 (%)     Right arm --       Physical Exam      ED Course & MDM                  No data recorded     Warren Coma Scale Score: 15 (25 1704 : Zaira Patiño RN)                           Medical Decision Making  Parts of this chart have been completed using voice recognition software. Please excuse any errors of transcription.  My thought process and reason for plan has been formulated from the time that I saw the patient until the time of disposition and is not specific to one specific moment during their visit and furthermore my MDM encompasses this entire chart and  not only this text box.      HPI: Detailed above.    Exam: A medically appropriate exam performed, outlined above, given the known history and presentation.    History obtained from: ***    EKG: ***    Social Determinants of Health considered during this visit: ***    Medications given during visit:  Medications - No data to display     Diagnostic/tests  Labs Reviewed - No data to display   XR hand right 3+ views   Final Result   No acute osseous abnormality.   Signed by Mike Benavides MD           Considerations/further MDM:  Patient is a ***presenting for evaluation of ***    Differential diagnosis associated with the patient presentation includes: ***      Procedure  Procedures   digits.  Advised ice, elevation, NSAIDs and Tylenol for pain relief.  Advised follow-up for repeat imaging with primary care versus orthopedic surgery within 1 to 2 weeks.  Patient agreeable with plan of care and released in good condition.      Procedure  Procedures     Trinidad Nair PA-C  04/07/25 [id number]

## 2025-04-03 NOTE — DISCHARGE INSTRUCTIONS
Please keep the hand immobilized in the thumb spica splint and follow-up within 1 to 2 weeks with the orthopedic hand surgeon or your primary care provider for repeat imaging to make sure there is no evidence of fracture of your scaphoid bone in your hand.  Continue with Tylenol and Motrin on rotation for pain relief and use ice for swelling.    It is important to remember that your care does not end here and you must continue to monitor your condition closely. Please return to the emergency department for any worsening or concerning signs or symptoms as directed by our conversations and the discharge instructions. If you do not have a doctor please contact the referral number on your discharge instructions. Please contact any physician specialists provided in your discharge notes as it is very important to follow up with them regarding your condition. If you are unable to reach the physicians provided, please come back to the Emergency Department at any time.

## 2025-04-04 ENCOUNTER — APPOINTMENT (OUTPATIENT)
Dept: SLEEP MEDICINE | Facility: CLINIC | Age: 40
End: 2025-04-04
Payer: COMMERCIAL

## 2025-04-04 VITALS
WEIGHT: 165 LBS | SYSTOLIC BLOOD PRESSURE: 136 MMHG | HEIGHT: 64 IN | BODY MASS INDEX: 28.17 KG/M2 | OXYGEN SATURATION: 100 % | DIASTOLIC BLOOD PRESSURE: 94 MMHG | HEART RATE: 98 BPM

## 2025-04-04 DIAGNOSIS — G47.30 SLEEP-RELATED BREATHING DISORDER: ICD-10-CM

## 2025-04-04 DIAGNOSIS — F41.8 MIXED ANXIETY DEPRESSIVE DISORDER: ICD-10-CM

## 2025-04-04 DIAGNOSIS — F41.9 ANXIETY: ICD-10-CM

## 2025-04-04 DIAGNOSIS — F51.04 CHRONIC INSOMNIA: Primary | ICD-10-CM

## 2025-04-04 RX ORDER — ESCITALOPRAM OXALATE 5 MG/1
5 TABLET ORAL DAILY
Qty: 30 TABLET | Refills: 2 | Status: SHIPPED | OUTPATIENT
Start: 2025-04-04 | End: 2025-07-03

## 2025-04-04 ASSESSMENT — SLEEP AND FATIGUE QUESTIONNAIRES
HOW LIKELY ARE YOU TO NOD OFF OR FALL ASLEEP WHILE SITTING AND READING: WOULD NEVER DOZE
HOW LIKELY ARE YOU TO NOD OFF OR FALL ASLEEP IN A CAR, WHILE STOPPED FOR A FEW MINUTES IN TRAFFIC: WOULD NEVER DOZE
SITING INACTIVE IN A PUBLIC PLACE LIKE A CLASS ROOM OR A MOVIE THEATER: WOULD NEVER DOZE
HOW LIKELY ARE YOU TO NOD OFF OR FALL ASLEEP WHILE SITTING QUIETLY AFTER LUNCH WITHOUT ALCOHOL: WOULD NEVER DOZE
HOW LIKELY ARE YOU TO NOD OFF OR FALL ASLEEP WHILE LYING DOWN TO REST IN THE AFTERNOON WHEN CIRCUMSTANCES PERMIT: WOULD NEVER DOZE
ESS-CHAD TOTAL SCORE: 0
HOW LIKELY ARE YOU TO NOD OFF OR FALL ASLEEP WHEN YOU ARE A PASSENGER IN A CAR FOR AN HOUR WITHOUT A BREAK: WOULD NEVER DOZE
HOW LIKELY ARE YOU TO NOD OFF OR FALL ASLEEP WHILE WATCHING TV: WOULD NEVER DOZE
HOW LIKELY ARE YOU TO NOD OFF OR FALL ASLEEP WHILE SITTING AND TALKING TO SOMEONE: WOULD NEVER DOZE

## 2025-04-04 ASSESSMENT — PATIENT HEALTH QUESTIONNAIRE - PHQ9
SUM OF ALL RESPONSES TO PHQ9 QUESTIONS 1 & 2: 2
1. LITTLE INTEREST OR PLEASURE IN DOING THINGS: SEVERAL DAYS
10. IF YOU CHECKED OFF ANY PROBLEMS, HOW DIFFICULT HAVE THESE PROBLEMS MADE IT FOR YOU TO DO YOUR WORK, TAKE CARE OF THINGS AT HOME, OR GET ALONG WITH OTHER PEOPLE: SOMEWHAT DIFFICULT
2. FEELING DOWN, DEPRESSED OR HOPELESS: SEVERAL DAYS

## 2025-04-04 ASSESSMENT — LIFESTYLE VARIABLES
HOW OFTEN DO YOU HAVE SIX OR MORE DRINKS ON ONE OCCASION: NEVER
AUDIT-C TOTAL SCORE: 3
HOW OFTEN DO YOU HAVE A DRINK CONTAINING ALCOHOL: 2-4 TIMES A MONTH
HOW MANY STANDARD DRINKS CONTAINING ALCOHOL DO YOU HAVE ON A TYPICAL DAY: 3 OR 4
SKIP TO QUESTIONS 9-10: 0

## 2025-04-04 ASSESSMENT — PAIN SCALES - GENERAL: PAINLEVEL_OUTOF10: 6

## 2025-04-04 NOTE — PATIENT INSTRUCTIONS
Thank you for coming to the [facility] today! Your sleep medicine provider today was: Nathan Rocha PA-C Below is a summary of your treatment plan, other important information, and our contact numbers:      TREATMENT PLAN     Call [phone] ([phone]), option 3 to schedule your sleep study. When you have an appointment please call us back at [phone] to schedule a followup appointment 3-4 weeks after to review results.       Trouble falling asleep or trouble staying asleep is called INSOMNIA and it can be caused by many different things such as untreated sleep apnea, anxiety, depression, stress, poor sleep habits, and other medical conditions or medications. The best way to treat insomnia is to treat the cause. In general, we can all benefit from better sleep hygiene (also known as good sleep habits). Below are recommendations to help you improve your sleep so you can fall asleep, stay asleep, and wake up feeling refreshed.    Keep a routine bedtime and rise time even on weekends and non-work days. This helps your biological clock stay in sync with your sleep needs. If you currently have variable sleep-wake schedule, start off by waking up and getting out of bed the same time every day, even on weekends or non-work days. Whether you have a good or poor sleep the night before, waking up at the same time every day can help re-train and reset your body clock.  Go to bed when you are sleepy and not when tired nor before your goal bedtime. Long periods of time in bed will lead to fragmented, shallow, broken sleep.   Use the bed for sleep and intimacy only. Do not watch TV, eat, read or use phone/laptop in bed. Keeping sleep as the only activity in bed will help re-associate bed equals sleep.  Get up when you cannot sleep in 15-20 minutes. When you are unable to sleep, exit the bed, and go to another room or chair in bedroom, do something boring, calm, relaxing, distracting, or non-stimulating in dim  "lighting until you feel sleepy enough to fall back asleep. Avoid stimulating activity or any triggers for mental thinking (e.g. cellphone). Repeat as needed.  Avoid napping during the day to build up sleep pressure so that it won't be hard for you to fall asleep at night. Napping, particularly in the late afternoon or early evening may interfere with your night's sleep. If naps are necessary, limit naps under 15-20 minutes and not later than 3 PM.   Create a \"buffer zone\" or \"wind-down time\". This is a quiet time prior to bedtime, typically at least 30 minutes and perhaps as long as 1-2 hours. During this time, you should do things that are enjoyable, relaxing, and not necessarily goal-oriented like performing relaxation exercises, listening to relaxing music, reading a boring book, dimming the lights, or eating a light bedtime snack like a glass of milk and banana which can promote sleep. Activities that promote relaxation before sleep is important because these can help quiet the mind and relax the body to get into the right state for sleep.   Do not worry or plan in bed. If you are worrying, planning, feeling anxious, or cannot shut off your thoughts, get up and stay out of bed until you have quieted your mind. Set up a “scheduled worry time” in the morning or late afternoon to write down your worries and stressors as well as plans for the following day.   Keep your bedroom quiet, dark, and cool. Ideal sleeping temperature is 65F. If light bothers you, get a slumber mask or blackout shades. If noise bothers you, put ear plugs or get a white noise machine. Alternatively, you may turn on fan or humidifier to create a constant background noise to eliminate unexpected sounds that would otherwise wake you up in the middle of your sleep.  Keep your bedroom technology free. Avoid exposure to TV and electronics 1-2 hours prior to bedtime. May also consider wearing \"blue light blocker\" eyeglasses.  Turn the clock around " to avoid clock-watching. Thoughts of the time can cause frustration and make it more difficult to go to sleep.   Other things to avoid to help   Avoid  caffeine (including chocolate) intake in the late afternoon and early evening. Limit the amount of caffeine and consume before noon.   Avoid smoking 2-3 hours before bedtime. Like caffeine, nicotine in cigarette smoking acts a stimulant.   Avoid alcohol intake 2-3 hours before bedtime. Although alcohol may seem to help you fall asleep more easily as it slows down brain activity, it also disrupts your sleep during the night by causing frequent awakenings as the effect of alcohol wears off. Additionally, alcohol worsens snoring and sleep apnea  Avoid eating a heavy meal (high-fat, high-carbohydrate, gas-producing foods) at dinner and 2-3 hours before bedtime.    Avoid drinking more than 8 oz liquids in the evening. Limit fluid intake at 8 oz during dinner. Restrict fluids after dinner. May take sips of water enough to swallow bedtime medications. Void at bedtime.  Avoid physical exercise or hot shower/bath within 2 hours of bedtime. Regular exercise can improve sleep quality, but exercising or having a hot shower/bath too close to bedtime can disrupt sleep onset. The best time to exercise to help sleep is in the late afternoon or early evening but not within 2 hours of bedtime. In order to promote sleep, hot shower/bath can be taken in the evening for 15-20 minutes but not within 2 hours of going to bed.   Avoid sleeping with pets or kids if they appear to bother your sleep and/or sleep quality.  Last but the least, DO NOT TRY TOO HARD TO SLEEP. JUST ALLOW SLEEP TO UNFOLD.    MOST IMPORTANTLY:  BE PATIENT!  BE CONSISTENT!  Your sleep problem developed over time so it will take some time and consistency to return to a more normal sleep pattern. By following the suggestions listed above, you should see gradual sleep improvements over time.    Also you have been  recommended to do Cognitive Behavioral Therapy for Insomnia (CBT-I). CBT-I is widely recognized as an effective treatment for a wide range of insomnias. CBT-I is typically made up of a number of components including assessment, behavioral and cognitive interventions, motivational techniques, and relapse prevention skills. An overwhelming amount of evidence suggests that CBT-I is as effective as hypnotics and the newer non-benzodiazepine sleep aides in the acute treatment and is more effective than medication in the long term treatment of insomnia.     Below are some resources for CBT-I:  To see a Behavioral Sleep Medicine specialist for one-on-one counseling  CBT-I at the [facility] - Dr. Luis Whitlock PsyD or Dr. Wendy Martinez, PhD - Phone: [phone]  Fax: [phone]. There may be a several month waiting period.  CBT-I at [facility] - Lisandra Vaz PsyD (Call [phone] and speak with Shruthi or Amada  Fax: [phone] or backup fax: [phone])  Dr. Barbara Clark - [email or web address]  - She only does telemedicine. She was formerly part of  but is in private practice and would be out-of-network  Dr. Blanc - neisha on one CBT-I service www.[employer]. You may have to pay out of pocket and submit visits to your insurance for reimbursement.  Other BSM providers in [place]:  https://www.behavioralsleep.org/index.php/directory/browse-by/state?value=[place]    2. If doing CBTi using an online platform, there are several online platforms that are good resources, but may vary based on cost. These include:  Ree- online course via [facility]. Self-guided. One time charge to sign up: Ree ($40)  The SleepReset - online guided cognitive behavioral therapy [email or web address] ($300 for 8 weeks)  Sleepio - https://www.sleepio.com/sleepio/welcomeusint/354#1/1 (Some insurances or employers may cover - check with your HR Benefits office)  SleepEZ- Free self-guided course from the  [place] https://www.[employer].[place].gov/insomnia/

## 2025-04-14 ENCOUNTER — APPOINTMENT (OUTPATIENT)
Dept: RADIOLOGY | Facility: HOSPITAL | Age: 40
End: 2025-04-14
Payer: COMMERCIAL

## 2025-04-29 ENCOUNTER — OFFICE VISIT (OUTPATIENT)
Dept: PRIMARY CARE | Facility: CLINIC | Age: 40
End: 2025-04-29
Payer: COMMERCIAL

## 2025-04-29 VITALS
HEART RATE: 93 BPM | HEIGHT: 64 IN | BODY MASS INDEX: 28.24 KG/M2 | SYSTOLIC BLOOD PRESSURE: 108 MMHG | TEMPERATURE: 98.3 F | WEIGHT: 165.4 LBS | OXYGEN SATURATION: 99 % | DIASTOLIC BLOOD PRESSURE: 78 MMHG

## 2025-04-29 DIAGNOSIS — F41.9 ANXIETY: Primary | ICD-10-CM

## 2025-04-29 DIAGNOSIS — M54.2 CERVICALGIA: ICD-10-CM

## 2025-04-29 PROCEDURE — 99214 OFFICE O/P EST MOD 30 MIN: CPT | Performed by: FAMILY MEDICINE

## 2025-04-29 PROCEDURE — 1036F TOBACCO NON-USER: CPT | Performed by: FAMILY MEDICINE

## 2025-04-29 PROCEDURE — 3008F BODY MASS INDEX DOCD: CPT | Performed by: FAMILY MEDICINE

## 2025-04-29 RX ORDER — TIZANIDINE 4 MG/1
4 TABLET ORAL NIGHTLY PRN
Qty: 90 TABLET | Refills: 1 | Status: SHIPPED | OUTPATIENT
Start: 2025-04-29 | End: 2026-04-29

## 2025-04-29 RX ORDER — DIAZEPAM 5 MG/1
TABLET ORAL
Qty: 30 TABLET | Refills: 0 | Status: SHIPPED | OUTPATIENT
Start: 2025-04-29

## 2025-04-29 ASSESSMENT — ANXIETY QUESTIONNAIRES
3. WORRYING TOO MUCH ABOUT DIFFERENT THINGS: NEARLY EVERY DAY
2. NOT BEING ABLE TO STOP OR CONTROL WORRYING: NEARLY EVERY DAY
GAD7 TOTAL SCORE: 14
5. BEING SO RESTLESS THAT IT IS HARD TO SIT STILL: SEVERAL DAYS
IF YOU CHECKED OFF ANY PROBLEMS ON THIS QUESTIONNAIRE, HOW DIFFICULT HAVE THESE PROBLEMS MADE IT FOR YOU TO DO YOUR WORK, TAKE CARE OF THINGS AT HOME, OR GET ALONG WITH OTHER PEOPLE: VERY DIFFICULT
4. TROUBLE RELAXING: SEVERAL DAYS
6. BECOMING EASILY ANNOYED OR IRRITABLE: NEARLY EVERY DAY
1. FEELING NERVOUS, ANXIOUS, OR ON EDGE: NEARLY EVERY DAY
7. FEELING AFRAID AS IF SOMETHING AWFUL MIGHT HAPPEN: NOT AT ALL

## 2025-04-29 ASSESSMENT — LIFESTYLE VARIABLES
HOW OFTEN DO YOU HAVE SIX OR MORE DRINKS ON ONE OCCASION: LESS THAN MONTHLY
HOW MANY STANDARD DRINKS CONTAINING ALCOHOL DO YOU HAVE ON A TYPICAL DAY: 3 OR 4
AUDIT-C TOTAL SCORE: 4
SKIP TO QUESTIONS 9-10: 0
HOW OFTEN DO YOU HAVE A DRINK CONTAINING ALCOHOL: 2-4 TIMES A MONTH

## 2025-04-29 ASSESSMENT — PAIN SCALES - GENERAL: PAINLEVEL_OUTOF10: 0-NO PAIN

## 2025-04-29 NOTE — PROGRESS NOTES
History Of Present Illness  Kitty Dunbar is a 40 y.o. female presenting for Imsomia and Anxiety (Follow up on anxiety)  .    HPI   Has been seeing psychiatry without which she feels a satisfactory outcome.  Has tried various medications without improvement of her symptoms.  Was on seroquel, worked but gaining weight.  Tried Ambien (didn't work), Dayvigo (allergy), remeron (night terrors).   Currently on Buspirone.  Tried Zoloft, paxil, Xanax in the past, without relief.  Tried not taking adderall without relief.  Hasn't slept full night in weeks and is feeling extremely emotional labile.  Sometimes has had to leave work due to how she is feeling.    Off work currently for hip pain, awaiting consultation for surgery    Past Medical History  Patient Active Problem List    Diagnosis Date Noted    Panic attacks 2025    Allergic rhinitis 2025    Irritable bowel syndrome with constipation 2025    Primary insomnia 2025    Mixed anxiety depressive disorder 2025    Situational depression 2025    Situational mixed anxiety and depressive disorder 2025    Major depressive disorder, recurrent severe without psychotic features (Multi) 2025    Alcohol dependence, uncomplicated (Multi) 2025    Strain of left hip 2024    Left hip pain 2024    Generalized hyperhidrosis 2023    Hypothyroidism 2022    Anxiety 2022    Vitamin D deficiency 2022    Kidney stones 2012    Tear of left acetabular labrum 2025    Femoroacetabular impingement of left hip 2025    Loose body in left hip 2025        Medications  Medications ordered prior to the current encounter[1]     Surgical History  She has a past surgical history that includes  section, classic (); Breast surgery (); and Dilation and curettage of uterus.     Social History  She reports that she quit smoking about 7 years ago. Her smoking use included  "cigarettes. She started smoking about 23 years ago. She has a 4 pack-year smoking history. She has been exposed to tobacco smoke. She has never used smokeless tobacco. She reports current alcohol use of about 4.0 standard drinks of alcohol per week. She reports that she does not currently use drugs.    Family History  Family History[2]     Allergies  Ambien [zolpidem], Effexor [venlafaxine], Nickel, Shellfish derived, Sulfa (sulfonamide antibiotics), and Trazodone    Immunizations  Immunization History   Administered Date(s) Administered    Flu vaccine (IIV4), preservative free *Check age/dose* 10/16/2017, 10/31/2022    Flu vaccine, quadrivalent, no egg protein, age 6 month or greater (FLUCELVAX) 11/25/2019    Influenza, injectable, quadrivalent 10/13/2018, 11/22/2021    Influenza, seasonal, injectable 11/27/2015, 11/18/2016    Moderna SARS-CoV-2 Vaccination 01/15/2021, 02/01/2021, 02/28/2021    PPD Test 12/10/2012, 08/26/2013, 09/23/2013    Tdap vaccine, age 7 year and older (BOOSTRIX, ADACEL) 10/16/2017        ROS  Negative, except as discussed in HPI     Vitals  /78   Pulse 93   Temp 36.8 °C (98.3 °F)   Ht 1.626 m (5' 4\")   Wt 75 kg (165 lb 6.4 oz)   LMP 04/29/2025 (Exact Date)   SpO2 99%   BMI 28.39 kg/m²      Physical Exam  Constitutional:       General: She is not in acute distress.  Pulmonary:      Effort: Pulmonary effort is normal.   Neurological:      Mental Status: She is alert.   Psychiatric:         Attention and Perception: Attention normal.         Mood and Affect: Affect is tearful.         Speech: Speech normal.         Behavior: Behavior normal.         Thought Content: Thought content normal.         Cognition and Memory: Cognition normal.         Judgment: Judgment normal.         Relevant Results  Lab Results   Component Value Date    WBC 4.5 02/16/2023    WBC 5.0 05/26/2022    HGB 13.9 02/16/2023    HGB 15.6 (H) 08/09/2022    HCT 40.9 02/16/2023    HCT 45.0 (H) 08/09/2022    MCV " "94.5 02/16/2023    MCV 92.6 05/26/2022     02/16/2023     05/26/2022     Lab Results   Component Value Date     02/16/2023     02/18/2022    K 3.6 02/16/2023    K 4.1 02/18/2022     02/16/2023     02/18/2022    CO2 23 (L) 02/16/2023    CO2 25 02/18/2022    BUN 13 02/16/2023    BUN 13 02/18/2022    CREATININE 0.7 02/16/2023    CREATININE 0.6 02/18/2022    CALCIUM 9.0 02/16/2023    CALCIUM 8.9 02/18/2022    PROT 7.1 02/16/2023    PROT 6.6 02/18/2022    BILITOT 0.6 02/16/2023    BILITOT 0.2 02/18/2022    ALKPHOS 60 02/16/2023    ALKPHOS 55 02/18/2022    ALT 12 02/16/2023    ALT 11 02/18/2022    AST 16 02/16/2023    AST 11 02/18/2022    GLUCOSE 81 02/16/2023    GLUCOSE 109 (H) 02/18/2022     No results found for: \"HGBA1C\"  Lab Results   Component Value Date    TSH 2.61 02/16/2023    FREET4 1.0 05/05/2021      Lab Results   Component Value Date    CHOL 157 02/16/2023    TRIG 41 02/16/2023    HDL 91 02/16/2023           Assessment/Plan   Kitty was seen today for imsomia and anxiety.  Diagnoses and all orders for this visit:  Anxiety (Primary)  Comments:  OARRS reviewed  Orders:  -     Referral to Psychiatry; Future  -     diazePAM (Valium) 5 mg tablet; Take one tablet by mouth once daily as needed for sleep or anxiety  Cervicalgia  -     tiZANidine (Zanaflex) 4 mg tablet; Take 1 tablet (4 mg) by mouth as needed at bedtime for muscle spasms.         Counseling:   Medication education:   -Education:  The patient is counseled regarding potential side-effects of any and all new medications  -Understanding:  Patient expressed understanding of information discussed today  -Adherence:  No barriers to adherence identified    Final treatment plan is a result of shared decision making with patient.         Gucci Liu MD          [1]   Current Outpatient Medications   Medication Sig Dispense Refill    amphetamine-dextroamphetamine XR (Adderall XR) 20 mg 24 hr capsule Take 1 capsule by mouth " once a day 30 capsule 0    busPIRone (Buspar) 15 mg tablet Take 1 tablet by mouth twice a day 60 tablet 0    lidocaine (Lidoderm) 5 % patch Place 1 patch over 12 hours on the skin once daily. Remove & discard patch within 12 hours or as directed by MD. 30 patch 1    vilazodone (Viibryd) 40 mg tablet Take 2 tablets by mouth once a day 60 tablet 0    diazePAM (Valium) 5 mg tablet Take one tablet by mouth once daily as needed for sleep or anxiety 30 tablet 0    gabapentin (Neurontin) 300 mg capsule Take 1 capsule (300 mg) by mouth 2 times a day. 60 capsule 1    tiZANidine (Zanaflex) 4 mg tablet Take 1 tablet (4 mg) by mouth as needed at bedtime for muscle spasms. 90 tablet 1     No current facility-administered medications for this visit.   [2] No family history on file.

## 2025-04-30 ENCOUNTER — HOSPITAL ENCOUNTER (OUTPATIENT)
Dept: RADIOLOGY | Facility: EXTERNAL LOCATION | Age: 40
Discharge: HOME | End: 2025-04-30

## 2025-04-30 ENCOUNTER — OFFICE VISIT (OUTPATIENT)
Dept: ORTHOPEDIC SURGERY | Facility: HOSPITAL | Age: 40
End: 2025-04-30
Payer: COMMERCIAL

## 2025-04-30 DIAGNOSIS — S73.192A ACETABULAR LABRUM TEAR, LEFT, INITIAL ENCOUNTER: Primary | ICD-10-CM

## 2025-04-30 DIAGNOSIS — M25.852 FEMOROACETABULAR IMPINGEMENT OF LEFT HIP: ICD-10-CM

## 2025-04-30 DIAGNOSIS — S73.192A ACETABULAR LABRUM TEAR, LEFT, INITIAL ENCOUNTER: ICD-10-CM

## 2025-04-30 DIAGNOSIS — M25.552 LEFT HIP PAIN: Primary | ICD-10-CM

## 2025-04-30 PROBLEM — M24.052 LOOSE BODY IN LEFT HIP: Status: ACTIVE | Noted: 2025-04-30

## 2025-04-30 PROCEDURE — 1036F TOBACCO NON-USER: CPT | Performed by: PHYSICIAN ASSISTANT

## 2025-04-30 PROCEDURE — L1686 HO POST-OP HIP ABDUCTION: HCPCS | Performed by: ORTHOPAEDIC SURGERY

## 2025-04-30 PROCEDURE — 99214 OFFICE O/P EST MOD 30 MIN: CPT | Performed by: ORTHOPAEDIC SURGERY

## 2025-04-30 PROCEDURE — E0114 CRUTCH UNDERARM PAIR NO WOOD: HCPCS | Performed by: ORTHOPAEDIC SURGERY

## 2025-04-30 PROCEDURE — 2500000004 HC RX 250 GENERAL PHARMACY W/ HCPCS (ALT 636 FOR OP/ED): Performed by: PHYSICIAN ASSISTANT

## 2025-04-30 PROCEDURE — 20611 DRAIN/INJ JOINT/BURSA W/US: CPT | Mod: LT | Performed by: PHYSICIAN ASSISTANT

## 2025-04-30 RX ORDER — LIDOCAINE HYDROCHLORIDE 10 MG/ML
4 INJECTION, SOLUTION INFILTRATION; PERINEURAL
Status: COMPLETED | OUTPATIENT
Start: 2025-04-30 | End: 2025-04-30

## 2025-04-30 RX ADMIN — LIDOCAINE HYDROCHLORIDE 4 ML: 10 INJECTION, SOLUTION INFILTRATION; PERINEURAL at 12:14

## 2025-04-30 NOTE — PROGRESS NOTES
HPI  This is a pleasant 40 y.o. female here today for left hip pain.  She reports that on December 10 of last year, she was standing on a stepstool and lost her balance, planting her left foot into a dog bowl twisting the leg and falling onto her back.  She had immediate onset of anterior based and left hip pain.  She was then evaluated by Dr. Paez, who prescribed physical therapy in January and February without any perceived benefit.  She also had a left hip corticosteroid injection with some relief from the anesthetic portion of the injection.  She has been having severe anterior-based groin pain that extends into the lateral hip, rated as a 5-6 out of 10 and increases from there with activity.  She has pain with prolonged sitting and standing.  She is now unable to work out and has difficulty performing ADLs around the house.  Her pain is also now interfering with her sleep, she reports she was only able to get 2 hours of sleep last night.  Previously obtained left hip MR arthrogram demonstrated a superolateral labral tear.  She was then evaluated by Dr. Padilla, who recommended surgical intervention in the form of left hip arthroscopy with labral repair and osteoplasty.  She was told that she could only get into surgery in October and she is unable to wait that long.        Medical History[1]    Surgical History[2]    Social History[3]       ROS  Review of systems reviewed and pertinent positives mentioned in HPI.      PHYSICAL EXAM  There is not  pain with a resisted situp.    There is not abdominal distention or tenderness    The patient's range of motion reveals that they have 90° of hip flexion on the affected side.   ER 50 degrees.   IR 20 degrees  Hip extension to 10°   Abduction to 45°    The patient is 5 out of 5 strength with resisted hip AB, adduction, hamstring and quadriceps testing.    No pain over the hip flexor, ASIS.  No pain over the proximal hamstring, piriformis      Pain Provacation  testing:    Positive impingement sign,with a painful arc from 12 to 3:00.  Negative Psoas impingement/Josef test  Negative instability, Log roll.  Positive subspine impingement test, which is pain with straight hip flexion.    Negative straight leg raise.  Negative circumduction clunk.      Peritrochanteric space examination:  Tenderness over the matt-trochanteric space - No  pain over the posterior trochanter - Yes      IMAGING  X-rays reviewed reveal no gross fracture or dislocation. and evidence of femoral acetabular impingement with an alpha angle of 78.  Acetabular index of 6  without acetabular retroversion.  Lateral center edge of 22.  Medial clear space of 10.8 mm.  50% posterior uncoverage.  There is also evidence of mild osteoarthritis with diminished medial clear space.    MRI reviewed reveals tearing of the acetabular labrum.      ASSESSMENT/PLAN  This is a 40 y.o. female patient here today with significant hip pain secondary to Left femoral acetabular impingement, acetabular dysplasia and likely mild arthritis.  Long discussion was held with the patient about the nature of her condition.  We discussed her left-sided hip dysplasia in addition to her cam morphology and labral tear on MRI which is likely contributing to her hip pain.  However, she is less clear on if she received benefit from the  diagnostic portion of her left hip corticosteroid injection, raising the possibility that her hip pain is not of intracapsular origin.  Her hip dysplasia in her age group contributing to worse outcomes after isolated hip arthroscopy was thoroughly discussed.  To obtain once again more accurate information on the exact source of her pain, patient was offered a repeat diagnostic injection with lidocaine into the left hip to fully elucidate if this is the source of her pain.  Patient was in agreement with this plan.  If she does receive benefit from the injection, we would offer the patient surgical intervention in  the form of left hip arthroscopy, labral repair, femoral osteoplasty, and capsular plication.  Once again, she understands that the results of isolated left hip arthroscopy in the setting of underlying hip dysplasia and early osteoarthritis are not as consistent as hip arthroscopy without underlying dysplasia and she voiced understanding of this.  We will proceed with scheduling surgery pending the results of her diagnostic injection.        Patient has exhausted conservative management including therapeutic exercises, activity modification, NSAIDS.  They continue to have worsening deep groin pain with mechanical symptoms affecting ADLs.  Patient would like to proceed with surgery entailing a hip arthroscopy, acetabuloplasty for acetabular retroversion, labral repair, femoroplasty, loose body removal for possible cartilaginous loose body seen on MRI, and capsular plication for mild hip instability.    We discussed the risks and benefits of surgery which included but were not limited to bleeding, infection, damage to nerves, damage to blood vessels, need for further procedures, risks of anesthesia, heterotopic ossification, femoral neck stress fracture, avascular necrosis of the femoral head, pudendal nerve palsy iatrogenic instability progression of osteoarthritis.    Patient was prescribed a hinged hip brace for MARIELLA/labral tear.  The patient is ambulatory with or without aid; but, has weakness, instability and/or deformity of their left hip which requires stabilization from this orthosis to improve their function.      Verbal and written instructions for the use, wear schedule, cleaning and application of this item were given.  Patient was instructed that should the brace result in increased pain, decreased sensation, increased swelling, or an overall worsening of their medical condition, to please contact our office immediately.     Patient was prescribed crutches for MARIELLA/labral tear.  This mobility device is  required for the following reasons:    1. The patient has a mobility limitation that significantly impairs their ability to participate in one or more mobility-related activities of daily living (MRADL) in the home; and  2. The patient is able to safely use the mobility device; and  3. The functional mobility deficit can be sufficiently resolved with use of the mobility device    Verbal and written instructions for the use, wear schedule, cleaning and application of this item were given.  Education provided also included gait training and safety precautions when using this device. Patient was instructed that should the item result in increased pain, decreased sensation, increased swelling, or an overall worsening of their medical condition, to please contact our office immediately.    Orthotic management and training was provided for skin care, modifications due to healing tissues, edema changes, interruption in skin integrity, and safety precautions with the orthosis.           Florentino Durham MD                         [1]   Past Medical History:  Diagnosis Date    Cervical disc herniation     Kidney stones     Lumbar disc herniation     Migraine    [2]   Past Surgical History:  Procedure Laterality Date    BREAST SURGERY       SECTION, CLASSIC  2008    DILATION AND CURETTAGE OF UTERUS     [3]   Social History  Tobacco Use    Smoking status: Former     Current packs/day: 0.00     Average packs/day: 0.3 packs/day for 16.0 years (4.0 ttl pk-yrs)     Types: Cigarettes     Start date:      Quit date: 2018     Years since quittin.3     Passive exposure: Past    Smokeless tobacco: Never   Vaping Use    Vaping status: Never Used   Substance Use Topics    Alcohol use: Yes     Alcohol/week: 4.0 standard drinks of alcohol     Types: 4 Glasses of wine per week     Comment: occasional    Drug use: Not Currently

## 2025-04-30 NOTE — PROGRESS NOTES
Patient is here today to see Dr. Olmedo regarding left hip pain.  He requested a diagnostic intra-articular injection to help delineate her symptoms and we proceeded as below.        Patient ID: Kitty Dunbar is a 40 y.o. female.    L Inj/Asp: L hip joint on 4/30/2025 12:14 PM  Indications: pain  Details: 20 G needle, ultrasound-guided anterior approach  Medications: 4 mL lidocaine 10 mg/mL (1 %)  Procedure, treatment alternatives, risks and benefits explained, specific risks discussed. Consent was given by the patient. Immediately prior to procedure a time out was called to verify the correct patient, procedure, equipment, support staff and site/side marked as required. Patient was prepped and draped in the usual sterile fashion.       Patient had 70% relief after the injection and felt it was continuing to improve.        Alie Washington PA-C

## 2025-05-06 ENCOUNTER — TELEPHONE (OUTPATIENT)
Dept: PRIMARY CARE | Facility: CLINIC | Age: 40
End: 2025-05-06
Payer: COMMERCIAL

## 2025-05-06 NOTE — TELEPHONE ENCOUNTER
PT CALLING ASKING IF DR FAJARDO CAN FILL OUT LA PAPERWORK PT STATES SHE HAS BEEN OUT OF WORK SINCE DECEMBER AND HAS BEEN IN THE OFFICE IN [place] STATES SHE WAS REFERRED TO A NEW ORTHO SURGEON DR MIRADNA STATES SHE IS NOT CONSIDERED A PATIENT ANYMORE BECAUSE HE REFERRED HER TO DR ROSELIA ANNE SHE SAW HIM ON 4/30 HAS SURGERY SCHEDULED 5/22 WAS TOLD BY THEM THEY WONT FILL OUT PAPERWORK UNTIL SHE HAS SURGERY

## 2025-05-13 ENCOUNTER — OFFICE VISIT (OUTPATIENT)
Dept: PRIMARY CARE | Facility: CLINIC | Age: 40
End: 2025-05-13
Payer: COMMERCIAL

## 2025-05-13 VITALS
SYSTOLIC BLOOD PRESSURE: 104 MMHG | BODY MASS INDEX: 28.51 KG/M2 | TEMPERATURE: 98.2 F | OXYGEN SATURATION: 99 % | DIASTOLIC BLOOD PRESSURE: 82 MMHG | HEIGHT: 64 IN | WEIGHT: 167 LBS | HEART RATE: 89 BPM

## 2025-05-13 DIAGNOSIS — S73.192S TEAR OF LEFT ACETABULAR LABRUM, SEQUELA: Primary | ICD-10-CM

## 2025-05-13 DIAGNOSIS — Z02.9 ADMINISTRATIVE ENCOUNTER: ICD-10-CM

## 2025-05-13 ASSESSMENT — LIFESTYLE VARIABLES
AUDIT-C TOTAL SCORE: 2
HOW OFTEN DO YOU HAVE A DRINK CONTAINING ALCOHOL: 2-4 TIMES A MONTH
HOW OFTEN DO YOU HAVE SIX OR MORE DRINKS ON ONE OCCASION: NEVER
HOW MANY STANDARD DRINKS CONTAINING ALCOHOL DO YOU HAVE ON A TYPICAL DAY: 1 OR 2
SKIP TO QUESTIONS 9-10: 1

## 2025-05-13 ASSESSMENT — PAIN SCALES - GENERAL: PAINLEVEL_OUTOF10: 5

## 2025-05-13 NOTE — PROGRESS NOTES
History Of Present Illness  Kitty Dunbar is a 40 y.o. female presenting for Hip Injury (Discuss hip injury./Paperwork fill out for work)  .    HPI     Past Medical History  Patient Active Problem List    Diagnosis Date Noted   • Panic attacks 2025   • Allergic rhinitis 2025   • Irritable bowel syndrome with constipation 2025   • Primary insomnia 2025   • Mixed anxiety depressive disorder 2025   • Situational depression 2025   • Situational mixed anxiety and depressive disorder 2025   • Major depressive disorder, recurrent severe without psychotic features (Multi) 2025   • Alcohol dependence, uncomplicated (Multi) 2025   • Strain of left hip 2024   • Left hip pain 2024   • Generalized hyperhidrosis 2023   • Hypothyroidism 2022   • Anxiety 2022   • Vitamin D deficiency 2022   • Kidney stones 2012   • Tear of left acetabular labrum 2025   • Femoroacetabular impingement of left hip 2025   • Loose body in left hip 2025        Medications  Medications ordered prior to the current encounter[1]     Surgical History  She has a past surgical history that includes  section, classic (); Breast surgery (); and Dilation and curettage of uterus.     Social History  She reports that she quit smoking about 7 years ago. Her smoking use included cigarettes. She started smoking about 23 years ago. She has a 4 pack-year smoking history. She has been exposed to tobacco smoke. She has never used smokeless tobacco. She reports current alcohol use of about 4.0 standard drinks of alcohol per week. She reports that she does not currently use drugs.    Family History  Family History[2]     Allergies  Ambien [zolpidem], Effexor [venlafaxine], Nickel, Shellfish derived, Sulfa (sulfonamide antibiotics), and Trazodone    Immunizations  Immunization History   Administered Date(s) Administered   • Flu vaccine (IIV4),  "preservative free *Check age/dose* 10/16/2017, 10/31/2022   • Flu vaccine, quadrivalent, no egg protein, age 6 month or greater (FLUCELVAX) 11/25/2019   • Influenza, injectable, quadrivalent 10/13/2018, 11/22/2021   • Influenza, seasonal, injectable 11/27/2015, 11/18/2016   • Moderna SARS-CoV-2 Vaccination 01/15/2021, 02/01/2021, 02/28/2021   • PPD Test 12/10/2012, 08/26/2013, 09/23/2013   • Tdap vaccine, age 7 year and older (BOOSTRIX, ADACEL) 10/16/2017        ROS  Negative, except as discussed in HPI     Vitals  /82   Pulse 89   Temp 36.8 °C (98.2 °F)   Ht 1.626 m (5' 4\")   Wt 75.8 kg (167 lb)   LMP 04/29/2025 (Exact Date)   SpO2 99%   BMI 28.67 kg/m²      Physical Exam    Relevant Results  Lab Results   Component Value Date    WBC 4.5 02/16/2023    WBC 5.0 05/26/2022    HGB 13.9 02/16/2023    HGB 15.6 (H) 08/09/2022    HCT 40.9 02/16/2023    HCT 45.0 (H) 08/09/2022    MCV 94.5 02/16/2023    MCV 92.6 05/26/2022     02/16/2023     05/26/2022     Lab Results   Component Value Date     02/16/2023     02/18/2022    K 3.6 02/16/2023    K 4.1 02/18/2022     02/16/2023     02/18/2022    CO2 23 (L) 02/16/2023    CO2 25 02/18/2022    BUN 13 02/16/2023    BUN 13 02/18/2022    CREATININE 0.7 02/16/2023    CREATININE 0.6 02/18/2022    CALCIUM 9.0 02/16/2023    CALCIUM 8.9 02/18/2022    PROT 7.1 02/16/2023    PROT 6.6 02/18/2022    BILITOT 0.6 02/16/2023    BILITOT 0.2 02/18/2022    ALKPHOS 60 02/16/2023    ALKPHOS 55 02/18/2022    ALT 12 02/16/2023    ALT 11 02/18/2022    AST 16 02/16/2023    AST 11 02/18/2022    GLUCOSE 81 02/16/2023    GLUCOSE 109 (H) 02/18/2022     No results found for: \"HGBA1C\"  Lab Results   Component Value Date    TSH 2.61 02/16/2023    FREET4 1.0 05/05/2021      Lab Results   Component Value Date    CHOL 157 02/16/2023    TRIG 41 02/16/2023    HDL 91 02/16/2023           Assessment/Plan   There are no diagnoses linked to this encounter. "       Counseling:   Medication education:   -Education:  The patient is counseled regarding potential side-effects of any and all new medications  -Understanding:  Patient expressed understanding of information discussed today  -Adherence:  No barriers to adherence identified    Final treatment plan is a result of shared decision making with patient.         Gucci Liu MD          [1]  Current Outpatient Medications   Medication Sig Dispense Refill   • amphetamine-dextroamphetamine XR (Adderall XR) 20 mg 24 hr capsule Take 1 capsule by mouth once a day 30 capsule 0   • busPIRone (Buspar) 15 mg tablet Take 1 tablet by mouth twice a day 60 tablet 0   • diazePAM (Valium) 5 mg tablet Take one tablet by mouth once daily as needed for sleep or anxiety 30 tablet 0   • gabapentin (Neurontin) 300 mg capsule Take 1 capsule (300 mg) by mouth 2 times a day. 60 capsule 1   • lidocaine (Lidoderm) 5 % patch Place 1 patch over 12 hours on the skin once daily. Remove & discard patch within 12 hours or as directed by MD. 30 patch 1   • tiZANidine (Zanaflex) 4 mg tablet Take 1 tablet (4 mg) by mouth as needed at bedtime for muscle spasms. 90 tablet 1   • vilazodone (Viibryd) 40 mg tablet Take 2 tablets by mouth once a day 60 tablet 0     No current facility-administered medications for this visit.   [2]  No family history on file.   tablet (750 mg) by mouth 3 times a day for 15 days. As needed for spasm 30 tablet 0    ondansetron (Zofran) 4 mg tablet Take 1 tablet (4 mg) by mouth every 8 hours if needed for nausea or vomiting for up to 10 days. 30 tablet 0    orphenadrine (Norflex) 100 mg 12 hr tablet Take 1 tablet (100 mg) by mouth 2 times a day as needed for muscle spasms. Do not crush, chew, or split. 30 tablet 0     No current facility-administered medications for this visit.   [2] No family history on file.

## 2025-05-21 ENCOUNTER — PHARMACY VISIT (OUTPATIENT)
Dept: PHARMACY | Facility: CLINIC | Age: 40
End: 2025-05-21
Payer: COMMERCIAL

## 2025-05-21 PROCEDURE — RXMED WILLOW AMBULATORY MEDICATION CHARGE

## 2025-05-21 NOTE — DISCHARGE INSTRUCTIONS
Hip Arthroscopy Postoperative Instructions       Diet  Begin with clear liquids and light foods (jello, soup, etc)  Progress to your normal diet if you are not nauseated    Wound Care  Maintain your operative dressing, loosen bandage if swelling occurs  It is normal to have some bleeding or oozing from the surgical sites due to fluid irrigation during the surgery.  Please change the dressing as needed.  Removal surgical dressings (including yellow gauze if present)  on the third post-operative day. If minimal drainage is present, apply bandaids over incisions and change daily.  Do not apply bacitracin or other ointments to the incisions.  You can remove CINTIA hose (long white socks) on the third post operative day  To avoid infection, keep incisions clean and dry.  You can shower 2 days post op.  MUST KEEP THE INCISIONS DRY.  NO immersion of the leg into a bath/pool etc.    Ice Therapy  Begin immediately after surgery  Use ice machine continuously or ice packs every 2 hours for 20 minutes daily.  Do not place the wrap or ice packs directly onto skin.     Activity  Elevate the operative leg to chest level whenever possible to decrease swelling  Do not place pillows under the knee, but rather place a pillow under the foot/ankle if needed  Use Crutches for ambulation.  You are 50% weight bearing on the surgical leg  Avoid long periods of sitting without the leg elevated or long distance travel for 2 weeks  No driving until discussed at your first post-operative appointment  May return to sedentary work or school once you have discontinued narcotic pain medication    Brace  Your brace should be worn at all times but can be removed for hygiene and physical therapy     Exercise  Do ankle pumps continuously throughout the day to reduce the possibility of a blood clot in your calf  Formal physical therapy will begin post-operative day #1      Medications  Pain medication is injected in the wound and hip during surgery.  This  will wear off within 8-12 hours.   You may have received a nerve block prior to surgery. If so, this will wear off within 24-36 hours.  Most patients require narcotic pain medication for a short period of time after surgery.  Common side effects include nausea, drowsiness and constipation.  To decrease side effects take these medications with food. If constipation occurs, you can utilize over the counter Colace or Miralax.  If you are having problems with nausea and vomiting, contact the office to discuss.  Do not drive a car or operate machinery while taking narcotic pain medication.  The following medications are enclosed to use post-operatively  Percocet (Oxycodone with Acetaminophen) for pain control  Indocin (Indomethacin) for heterotopic bone prophylaxis.  **MAKE SURE THIS MEDICATION IS FILLED AND TAKEN AS DIRECTED**  Baby aspirin twice a day to help reduce the risk of a blood clot  Zofran (Ondansetron) as needed for nausea  Robaxin (Methocarbamol) as needed for spasms should they occur  Naproxen to take as needed for pain after you complete the Indocin    Contact information  Our office phone is [phone].  Contact the office with any of the following  Painful swelling or numbness  Unrelenting pain  Fever over 101° or chills  Redness around incision  Continuous drainage or bleeding from the incision. A small amount is to be expected)  Color change in the leg  Trouble breathing  Excessive nausea or vomiting  If you have an emergency after hours on the weekend, please call [phone] to reach the answering service who will contact Dr. Olmedo  If you have an emergency that requires immediate attention, proceed to the nearest emergency room or call [phone].      Follow up  Your first post operative appointment should be scheduled around 14 days after surgery. Contact the office at [phone] if this has not yet been set up.

## 2025-05-22 ENCOUNTER — ANESTHESIA EVENT (OUTPATIENT)
Dept: OPERATING ROOM | Facility: HOSPITAL | Age: 40
End: 2025-05-22
Payer: COMMERCIAL

## 2025-05-22 ENCOUNTER — PHARMACY VISIT (OUTPATIENT)
Dept: PHARMACY | Facility: CLINIC | Age: 40
End: 2025-05-22
Payer: COMMERCIAL

## 2025-05-22 ENCOUNTER — APPOINTMENT (OUTPATIENT)
Dept: RADIOLOGY | Facility: HOSPITAL | Age: 40
End: 2025-05-22
Payer: COMMERCIAL

## 2025-05-22 ENCOUNTER — HOSPITAL ENCOUNTER (OUTPATIENT)
Facility: HOSPITAL | Age: 40
Setting detail: OUTPATIENT SURGERY
Discharge: HOME | End: 2025-05-22
Attending: ORTHOPAEDIC SURGERY | Admitting: ORTHOPAEDIC SURGERY
Payer: COMMERCIAL

## 2025-05-22 ENCOUNTER — ANESTHESIA (OUTPATIENT)
Dept: OPERATING ROOM | Facility: HOSPITAL | Age: 40
End: 2025-05-22
Payer: COMMERCIAL

## 2025-05-22 VITALS
DIASTOLIC BLOOD PRESSURE: 80 MMHG | RESPIRATION RATE: 14 BRPM | HEART RATE: 76 BPM | SYSTOLIC BLOOD PRESSURE: 107 MMHG | WEIGHT: 165.34 LBS | HEIGHT: 64 IN | BODY MASS INDEX: 28.23 KG/M2 | OXYGEN SATURATION: 94 % | TEMPERATURE: 97.7 F

## 2025-05-22 DIAGNOSIS — M25.852 FEMOROACETABULAR IMPINGEMENT OF LEFT HIP: ICD-10-CM

## 2025-05-22 DIAGNOSIS — M24.052 LOOSE BODY IN LEFT HIP: Primary | ICD-10-CM

## 2025-05-22 LAB
ALBUMIN SERPL-MCNC: 4.7 G/DL (ref 3.6–5.1)
ALBUMIN SERPL-MCNC: 4.7 G/DL (ref 3.6–5.1)
ALP SERPL-CCNC: 60 U/L (ref 31–125)
ALP SERPL-CCNC: 61 U/L (ref 31–125)
ALT SERPL-CCNC: 14 U/L (ref 6–29)
ALT SERPL-CCNC: 15 U/L (ref 6–29)
ANION GAP SERPL CALCULATED.4IONS-SCNC: 10 MMOL/L (CALC) (ref 7–17)
ANION GAP SERPL CALCULATED.4IONS-SCNC: 12 MMOL/L (CALC) (ref 7–17)
AST SERPL-CCNC: 13 U/L (ref 10–30)
AST SERPL-CCNC: 14 U/L (ref 10–30)
B-HCG SERPL-ACNC: <5 MIU/ML
BASOPHILS # BLD AUTO: 40 CELLS/UL (ref 0–200)
BASOPHILS NFR BLD AUTO: 0.8 %
BILIRUB SERPL-MCNC: 0.5 MG/DL (ref 0.2–1.2)
BILIRUB SERPL-MCNC: 0.5 MG/DL (ref 0.2–1.2)
BUN SERPL-MCNC: 12 MG/DL (ref 7–25)
BUN SERPL-MCNC: 12 MG/DL (ref 7–25)
CALCIUM SERPL-MCNC: 9.1 MG/DL (ref 8.6–10.2)
CALCIUM SERPL-MCNC: 9.2 MG/DL (ref 8.6–10.2)
CHLORIDE SERPL-SCNC: 103 MMOL/L (ref 98–110)
CHLORIDE SERPL-SCNC: 103 MMOL/L (ref 98–110)
CHOLEST SERPL-MCNC: 186 MG/DL
CHOLEST/HDLC SERPL: 2.5 (CALC)
CO2 SERPL-SCNC: 23 MMOL/L (ref 20–32)
CO2 SERPL-SCNC: 24 MMOL/L (ref 20–32)
CREAT SERPL-MCNC: 0.65 MG/DL (ref 0.5–0.99)
CREAT SERPL-MCNC: 0.66 MG/DL (ref 0.5–0.99)
EGFRCR SERPLBLD CKD-EPI 2021: 114 ML/MIN/1.73M2
EGFRCR SERPLBLD CKD-EPI 2021: 114 ML/MIN/1.73M2
EOSINOPHIL # BLD AUTO: 20 CELLS/UL (ref 15–500)
EOSINOPHIL NFR BLD AUTO: 0.4 %
ERYTHROCYTE [DISTWIDTH] IN BLOOD BY AUTOMATED COUNT: 11.8 % (ref 11–15)
ERYTHROCYTE [DISTWIDTH] IN BLOOD BY AUTOMATED COUNT: 11.9 % (ref 11–15)
GLUCOSE SERPL-MCNC: 100 MG/DL (ref 65–99)
GLUCOSE SERPL-MCNC: 102 MG/DL (ref 65–99)
HCT VFR BLD AUTO: 43.3 % (ref 35–45)
HCT VFR BLD AUTO: 43.3 % (ref 35–45)
HDLC SERPL-MCNC: 73 MG/DL
HGB BLD-MCNC: 14.4 G/DL (ref 11.7–15.5)
HGB BLD-MCNC: 14.4 G/DL (ref 11.7–15.5)
LDLC SERPL CALC-MCNC: 96 MG/DL (CALC)
LYMPHOCYTES # BLD AUTO: 1270 CELLS/UL (ref 850–3900)
LYMPHOCYTES NFR BLD AUTO: 25.4 %
MCH RBC QN AUTO: 31.9 PG (ref 27–33)
MCH RBC QN AUTO: 32.4 PG (ref 27–33)
MCHC RBC AUTO-ENTMCNC: 33.3 G/DL (ref 32–36)
MCHC RBC AUTO-ENTMCNC: 33.3 G/DL (ref 32–36)
MCV RBC AUTO: 95.8 FL (ref 80–100)
MCV RBC AUTO: 97.3 FL (ref 80–100)
MONOCYTES # BLD AUTO: 325 CELLS/UL (ref 200–950)
MONOCYTES NFR BLD AUTO: 6.5 %
NEUTROPHILS # BLD AUTO: 3345 CELLS/UL (ref 1500–7800)
NEUTROPHILS NFR BLD AUTO: 66.9 %
NONHDLC SERPL-MCNC: 113 MG/DL (CALC)
PLATELET # BLD AUTO: 301 THOUSAND/UL (ref 140–400)
PLATELET # BLD AUTO: 301 THOUSAND/UL (ref 140–400)
PMV BLD REES-ECKER: 10.5 FL (ref 7.5–12.5)
PMV BLD REES-ECKER: 10.6 FL (ref 7.5–12.5)
POTASSIUM SERPL-SCNC: 4.4 MMOL/L (ref 3.5–5.3)
POTASSIUM SERPL-SCNC: 4.4 MMOL/L (ref 3.5–5.3)
PROT SERPL-MCNC: 7.1 G/DL (ref 6.1–8.1)
PROT SERPL-MCNC: 7.2 G/DL (ref 6.1–8.1)
RBC # BLD AUTO: 4.45 MILLION/UL (ref 3.8–5.1)
RBC # BLD AUTO: 4.52 MILLION/UL (ref 3.8–5.1)
SODIUM SERPL-SCNC: 137 MMOL/L (ref 135–146)
SODIUM SERPL-SCNC: 138 MMOL/L (ref 135–146)
TRIGL SERPL-MCNC: 81 MG/DL
TSH SERPL-ACNC: 4.19 MIU/L
WBC # BLD AUTO: 5 THOUSAND/UL (ref 3.8–10.8)
WBC # BLD AUTO: 5.1 THOUSAND/UL (ref 3.8–10.8)

## 2025-05-22 PROCEDURE — 29861 HIP ARTHRO W/FB REMOVAL: CPT | Performed by: ORTHOPAEDIC SURGERY

## 2025-05-22 PROCEDURE — 2720000007 HC OR 272 NO HCPCS: Performed by: ORTHOPAEDIC SURGERY

## 2025-05-22 PROCEDURE — 7100000010 HC PHASE TWO TIME - EACH INCREMENTAL 1 MINUTE: Performed by: ORTHOPAEDIC SURGERY

## 2025-05-22 PROCEDURE — A29916 PR ARTHROSCOPY HIP W/LABRAL REPAIR: Performed by: ANESTHESIOLOGY

## 2025-05-22 PROCEDURE — RXMED WILLOW AMBULATORY MEDICATION CHARGE

## 2025-05-22 PROCEDURE — 29914 HIP ARTHRO W/FEMOROPLASTY: CPT | Performed by: PHYSICIAN ASSISTANT

## 2025-05-22 PROCEDURE — 7100000009 HC PHASE TWO TIME - INITIAL BASE CHARGE: Performed by: ORTHOPAEDIC SURGERY

## 2025-05-22 PROCEDURE — 29861 HIP ARTHRO W/FB REMOVAL: CPT | Performed by: PHYSICIAN ASSISTANT

## 2025-05-22 PROCEDURE — 2780000003 HC OR 278 NO HCPCS: Performed by: ORTHOPAEDIC SURGERY

## 2025-05-22 PROCEDURE — 64450 NJX AA&/STRD OTHER PN/BRANCH: CPT

## 2025-05-22 PROCEDURE — 2500000001 HC RX 250 WO HCPCS SELF ADMINISTERED DRUGS (ALT 637 FOR MEDICARE OP): Performed by: ANESTHESIOLOGY

## 2025-05-22 PROCEDURE — 2500000004 HC RX 250 GENERAL PHARMACY W/ HCPCS (ALT 636 FOR OP/ED): Mod: JZ | Performed by: ORTHOPAEDIC SURGERY

## 2025-05-22 PROCEDURE — 97116 GAIT TRAINING THERAPY: CPT | Mod: GP

## 2025-05-22 PROCEDURE — 7100000001 HC RECOVERY ROOM TIME - INITIAL BASE CHARGE: Performed by: ORTHOPAEDIC SURGERY

## 2025-05-22 PROCEDURE — 64473 LWR XTR FSCL PLN BLK UNI NJX: CPT

## 2025-05-22 PROCEDURE — 3600000009 HC OR TIME - EACH INCREMENTAL 1 MINUTE - PROCEDURE LEVEL FOUR: Performed by: ORTHOPAEDIC SURGERY

## 2025-05-22 PROCEDURE — 3700000001 HC GENERAL ANESTHESIA TIME - INITIAL BASE CHARGE: Performed by: ORTHOPAEDIC SURGERY

## 2025-05-22 PROCEDURE — 29916 HIP ARTHRO W/LABRAL REPAIR: CPT | Performed by: ORTHOPAEDIC SURGERY

## 2025-05-22 PROCEDURE — 29916 HIP ARTHRO W/LABRAL REPAIR: CPT | Performed by: PHYSICIAN ASSISTANT

## 2025-05-22 PROCEDURE — 29999 UNLISTED PX ARTHROSCOPY: CPT | Performed by: PHYSICIAN ASSISTANT

## 2025-05-22 PROCEDURE — 3600000004 HC OR TIME - INITIAL BASE CHARGE - PROCEDURE LEVEL FOUR: Performed by: ORTHOPAEDIC SURGERY

## 2025-05-22 PROCEDURE — 29915 HIP ARTHRO ACETABULOPLASTY: CPT | Performed by: PHYSICIAN ASSISTANT

## 2025-05-22 PROCEDURE — 3700000002 HC GENERAL ANESTHESIA TIME - EACH INCREMENTAL 1 MINUTE: Performed by: ORTHOPAEDIC SURGERY

## 2025-05-22 PROCEDURE — C1713 ANCHOR/SCREW BN/BN,TIS/BN: HCPCS | Performed by: ORTHOPAEDIC SURGERY

## 2025-05-22 PROCEDURE — 29914 HIP ARTHRO W/FEMOROPLASTY: CPT | Performed by: ORTHOPAEDIC SURGERY

## 2025-05-22 PROCEDURE — 2500000001 HC RX 250 WO HCPCS SELF ADMINISTERED DRUGS (ALT 637 FOR MEDICARE OP): Performed by: PHYSICIAN ASSISTANT

## 2025-05-22 PROCEDURE — 29915 HIP ARTHRO ACETABULOPLASTY: CPT | Performed by: ORTHOPAEDIC SURGERY

## 2025-05-22 PROCEDURE — 76942 ECHO GUIDE FOR BIOPSY: CPT

## 2025-05-22 PROCEDURE — 2500000005 HC RX 250 GENERAL PHARMACY W/O HCPCS: Performed by: ANESTHESIOLOGIST ASSISTANT

## 2025-05-22 PROCEDURE — A29916 PR ARTHROSCOPY HIP W/LABRAL REPAIR: Performed by: ANESTHESIOLOGIST ASSISTANT

## 2025-05-22 PROCEDURE — 29999 UNLISTED PX ARTHROSCOPY: CPT | Performed by: ORTHOPAEDIC SURGERY

## 2025-05-22 PROCEDURE — 2500000005 HC RX 250 GENERAL PHARMACY W/O HCPCS: Performed by: ANESTHESIOLOGY

## 2025-05-22 PROCEDURE — 7100000002 HC RECOVERY ROOM TIME - EACH INCREMENTAL 1 MINUTE: Performed by: ORTHOPAEDIC SURGERY

## 2025-05-22 PROCEDURE — 96372 THER/PROPH/DIAG INJ SC/IM: CPT | Performed by: ANESTHESIOLOGY

## 2025-05-22 PROCEDURE — 2500000004 HC RX 250 GENERAL PHARMACY W/ HCPCS (ALT 636 FOR OP/ED): Mod: JZ | Performed by: ANESTHESIOLOGIST ASSISTANT

## 2025-05-22 PROCEDURE — 2500000002 HC RX 250 W HCPCS SELF ADMINISTERED DRUGS (ALT 637 FOR MEDICARE OP, ALT 636 FOR OP/ED)

## 2025-05-22 PROCEDURE — 76000 FLUOROSCOPY <1 HR PHYS/QHP: CPT | Mod: LT

## 2025-05-22 PROCEDURE — 2500000004 HC RX 250 GENERAL PHARMACY W/ HCPCS (ALT 636 FOR OP/ED)

## 2025-05-22 PROCEDURE — 2500000004 HC RX 250 GENERAL PHARMACY W/ HCPCS (ALT 636 FOR OP/ED): Mod: JZ | Performed by: ANESTHESIOLOGY

## 2025-05-22 DEVICE — NANOTACK TT SUTURE ANCHOR, 1.4MM WITH 1.2MM XBRAID TT
Type: IMPLANTABLE DEVICE | Site: HIP | Status: FUNCTIONAL
Brand: NANOTACK

## 2025-05-22 RX ORDER — METHOCARBAMOL 100 MG/ML
INJECTION, SOLUTION INTRAMUSCULAR; INTRAVENOUS AS NEEDED
Status: DISCONTINUED | OUTPATIENT
Start: 2025-05-22 | End: 2025-05-22

## 2025-05-22 RX ORDER — LIDOCAINE HYDROCHLORIDE 20 MG/ML
INJECTION, SOLUTION EPIDURAL; INFILTRATION; INTRACAUDAL; PERINEURAL AS NEEDED
Status: DISCONTINUED | OUTPATIENT
Start: 2025-05-22 | End: 2025-05-22

## 2025-05-22 RX ORDER — ONDANSETRON HYDROCHLORIDE 2 MG/ML
4 INJECTION, SOLUTION INTRAVENOUS ONCE AS NEEDED
Status: COMPLETED | OUTPATIENT
Start: 2025-05-22 | End: 2025-05-22

## 2025-05-22 RX ORDER — SCOPOLAMINE 1 MG/3D
PATCH, EXTENDED RELEASE TRANSDERMAL AS NEEDED
Status: DISCONTINUED | OUTPATIENT
Start: 2025-05-22 | End: 2025-05-22

## 2025-05-22 RX ORDER — ACETAMINOPHEN 325 MG/1
975 TABLET ORAL ONCE
Status: COMPLETED | OUTPATIENT
Start: 2025-05-22 | End: 2025-05-22

## 2025-05-22 RX ORDER — ROCURONIUM BROMIDE 10 MG/ML
INJECTION, SOLUTION INTRAVENOUS AS NEEDED
Status: DISCONTINUED | OUTPATIENT
Start: 2025-05-22 | End: 2025-05-22

## 2025-05-22 RX ORDER — MORPHINE SULFATE 0.5 MG/ML
INJECTION, SOLUTION EPIDURAL; INTRATHECAL; INTRAVENOUS AS NEEDED
Status: DISCONTINUED | OUTPATIENT
Start: 2025-05-22 | End: 2025-05-22 | Stop reason: HOSPADM

## 2025-05-22 RX ORDER — OXYCODONE HYDROCHLORIDE 5 MG/1
5 TABLET ORAL ONCE
Status: DISCONTINUED | OUTPATIENT
Start: 2025-05-22 | End: 2025-05-22 | Stop reason: HOSPADM

## 2025-05-22 RX ORDER — SODIUM CHLORIDE, SODIUM LACTATE, POTASSIUM CHLORIDE, CALCIUM CHLORIDE 600; 310; 30; 20 MG/100ML; MG/100ML; MG/100ML; MG/100ML
INJECTION, SOLUTION INTRAVENOUS CONTINUOUS PRN
Status: DISCONTINUED | OUTPATIENT
Start: 2025-05-22 | End: 2025-05-22

## 2025-05-22 RX ORDER — LABETALOL HYDROCHLORIDE 5 MG/ML
5 INJECTION, SOLUTION INTRAVENOUS ONCE AS NEEDED
Status: DISCONTINUED | OUTPATIENT
Start: 2025-05-22 | End: 2025-05-22 | Stop reason: HOSPADM

## 2025-05-22 RX ORDER — OXYCODONE AND ACETAMINOPHEN 5; 325 MG/1; MG/1
1 TABLET ORAL SEE ADMIN INSTRUCTIONS
Qty: 16 TABLET | Refills: 0 | Status: SHIPPED | OUTPATIENT
Start: 2025-05-22 | End: 2025-05-25

## 2025-05-22 RX ORDER — ONDANSETRON HYDROCHLORIDE 2 MG/ML
INJECTION, SOLUTION INTRAVENOUS AS NEEDED
Status: DISCONTINUED | OUTPATIENT
Start: 2025-05-22 | End: 2025-05-22

## 2025-05-22 RX ORDER — PROMETHAZINE HYDROCHLORIDE 25 MG/ML
12.5 INJECTION, SOLUTION INTRAMUSCULAR; INTRAVENOUS ONCE
Status: COMPLETED | OUTPATIENT
Start: 2025-05-22 | End: 2025-05-22

## 2025-05-22 RX ORDER — HYDRALAZINE HYDROCHLORIDE 20 MG/ML
5 INJECTION INTRAMUSCULAR; INTRAVENOUS EVERY 30 MIN PRN
Status: DISCONTINUED | OUTPATIENT
Start: 2025-05-22 | End: 2025-05-22 | Stop reason: HOSPADM

## 2025-05-22 RX ORDER — ALBUTEROL SULFATE 0.83 MG/ML
2.5 SOLUTION RESPIRATORY (INHALATION) ONCE AS NEEDED
Status: DISCONTINUED | OUTPATIENT
Start: 2025-05-22 | End: 2025-05-22 | Stop reason: HOSPADM

## 2025-05-22 RX ORDER — FENTANYL CITRATE 50 UG/ML
INJECTION, SOLUTION INTRAMUSCULAR; INTRAVENOUS AS NEEDED
Status: DISCONTINUED | OUTPATIENT
Start: 2025-05-22 | End: 2025-05-22

## 2025-05-22 RX ORDER — ROPIVACAINE HYDROCHLORIDE 5 MG/ML
INJECTION, SOLUTION EPIDURAL; INFILTRATION; PERINEURAL AS NEEDED
Status: DISCONTINUED | OUTPATIENT
Start: 2025-05-22 | End: 2025-05-22

## 2025-05-22 RX ORDER — PROPOFOL 10 MG/ML
INJECTION, EMULSION INTRAVENOUS AS NEEDED
Status: DISCONTINUED | OUTPATIENT
Start: 2025-05-22 | End: 2025-05-22

## 2025-05-22 RX ORDER — GABAPENTIN 300 MG/1
600 CAPSULE ORAL ONCE
Status: COMPLETED | OUTPATIENT
Start: 2025-05-22 | End: 2025-05-22

## 2025-05-22 RX ORDER — MIDAZOLAM HYDROCHLORIDE 1 MG/ML
INJECTION, SOLUTION INTRAMUSCULAR; INTRAVENOUS AS NEEDED
Status: DISCONTINUED | OUTPATIENT
Start: 2025-05-22 | End: 2025-05-22

## 2025-05-22 RX ORDER — BUPIVACAINE HYDROCHLORIDE 5 MG/ML
INJECTION, SOLUTION EPIDURAL; INTRACAUDAL; PERINEURAL AS NEEDED
Status: DISCONTINUED | OUTPATIENT
Start: 2025-05-22 | End: 2025-05-22 | Stop reason: HOSPADM

## 2025-05-22 RX ORDER — PHENYLEPHRINE HCL IN 0.9% NACL 1 MG/10 ML
SYRINGE (ML) INTRAVENOUS AS NEEDED
Status: DISCONTINUED | OUTPATIENT
Start: 2025-05-22 | End: 2025-05-22

## 2025-05-22 RX ORDER — METHOCARBAMOL 750 MG/1
750 TABLET, FILM COATED ORAL 3 TIMES DAILY
Qty: 30 TABLET | Refills: 0 | Status: SHIPPED | OUTPATIENT
Start: 2025-05-22 | End: 2025-06-06

## 2025-05-22 RX ORDER — ONDANSETRON 4 MG/1
4 TABLET, FILM COATED ORAL EVERY 8 HOURS PRN
Qty: 30 TABLET | Refills: 0 | Status: SHIPPED | OUTPATIENT
Start: 2025-05-22 | End: 2025-06-01

## 2025-05-22 RX ORDER — CEFAZOLIN 1 G/1
INJECTION, POWDER, FOR SOLUTION INTRAVENOUS AS NEEDED
Status: DISCONTINUED | OUTPATIENT
Start: 2025-05-22 | End: 2025-05-22

## 2025-05-22 RX ORDER — NAPROXEN SODIUM 220 MG/1
81 TABLET, FILM COATED ORAL 2 TIMES DAILY
Qty: 28 TABLET | Refills: 0 | Status: SHIPPED | OUTPATIENT
Start: 2025-05-22 | End: 2025-06-05

## 2025-05-22 RX ORDER — KETOROLAC TROMETHAMINE 30 MG/ML
INJECTION, SOLUTION INTRAMUSCULAR; INTRAVENOUS AS NEEDED
Status: DISCONTINUED | OUTPATIENT
Start: 2025-05-22 | End: 2025-05-22

## 2025-05-22 RX ORDER — INDOMETHACIN 75 MG/1
75 CAPSULE, EXTENDED RELEASE ORAL DAILY
Qty: 10 CAPSULE | Refills: 0 | Status: SHIPPED | OUTPATIENT
Start: 2025-05-22 | End: 2025-06-01

## 2025-05-22 RX ORDER — LIDOCAINE HYDROCHLORIDE 10 MG/ML
0.1 INJECTION, SOLUTION EPIDURAL; INFILTRATION; INTRACAUDAL; PERINEURAL ONCE
Status: DISCONTINUED | OUTPATIENT
Start: 2025-05-22 | End: 2025-05-22 | Stop reason: HOSPADM

## 2025-05-22 RX ORDER — SODIUM CHLORIDE, SODIUM LACTATE, POTASSIUM CHLORIDE, CALCIUM CHLORIDE 600; 310; 30; 20 MG/100ML; MG/100ML; MG/100ML; MG/100ML
100 INJECTION, SOLUTION INTRAVENOUS CONTINUOUS
Status: DISCONTINUED | OUTPATIENT
Start: 2025-05-22 | End: 2025-05-22 | Stop reason: HOSPADM

## 2025-05-22 RX ORDER — APREPITANT 40 MG/1
CAPSULE ORAL AS NEEDED
Status: DISCONTINUED | OUTPATIENT
Start: 2025-05-22 | End: 2025-05-22

## 2025-05-22 RX ORDER — SODIUM CHLORIDE, SODIUM LACTATE, POTASSIUM CHLORIDE, AND CALCIUM CHLORIDE .6; .31; .03; .02 G/100ML; G/100ML; G/100ML; G/100ML
IRRIGANT IRRIGATION AS NEEDED
Status: DISCONTINUED | OUTPATIENT
Start: 2025-05-22 | End: 2025-05-22 | Stop reason: HOSPADM

## 2025-05-22 RX ORDER — OXYCODONE HYDROCHLORIDE 5 MG/1
5 TABLET ORAL EVERY 4 HOURS PRN
Status: DISCONTINUED | OUTPATIENT
Start: 2025-05-22 | End: 2025-05-22 | Stop reason: HOSPADM

## 2025-05-22 RX ORDER — ORPHENADRINE CITRATE 100 MG/1
100 TABLET, EXTENDED RELEASE ORAL 2 TIMES DAILY PRN
Qty: 30 TABLET | Refills: 0 | Status: SHIPPED | OUTPATIENT
Start: 2025-05-22

## 2025-05-22 RX ADMIN — ROPIVACAINE HYDROCHLORIDE 15 ML: 5 INJECTION, SOLUTION EPIDURAL; INFILTRATION; PERINEURAL at 10:12

## 2025-05-22 RX ADMIN — HYDROMORPHONE HYDROCHLORIDE 0.5 MG: 1 INJECTION, SOLUTION INTRAMUSCULAR; INTRAVENOUS; SUBCUTANEOUS at 13:23

## 2025-05-22 RX ADMIN — Medication 6 L/MIN: at 13:45

## 2025-05-22 RX ADMIN — ROCURONIUM BROMIDE 80 MG: 10 INJECTION INTRAVENOUS at 10:46

## 2025-05-22 RX ADMIN — GABAPENTIN 600 MG: 300 CAPSULE ORAL at 09:16

## 2025-05-22 RX ADMIN — HYDROMORPHONE HYDROCHLORIDE 0.5 MG: 1 INJECTION, SOLUTION INTRAMUSCULAR; INTRAVENOUS; SUBCUTANEOUS at 14:54

## 2025-05-22 RX ADMIN — LIDOCAINE HYDROCHLORIDE 100 MG: 20 INJECTION, SOLUTION EPIDURAL; INFILTRATION; INTRACAUDAL; PERINEURAL at 10:46

## 2025-05-22 RX ADMIN — OXYCODONE HYDROCHLORIDE 5 MG: 5 TABLET ORAL at 15:29

## 2025-05-22 RX ADMIN — Medication 200 MCG: at 12:52

## 2025-05-22 RX ADMIN — HYDROMORPHONE HYDROCHLORIDE 0.5 MG: 1 INJECTION, SOLUTION INTRAMUSCULAR; INTRAVENOUS; SUBCUTANEOUS at 13:34

## 2025-05-22 RX ADMIN — CARBOXYMETHYLCELLULOSE SODIUM 2 DROP: 0.5 SOLUTION/ DROPS OPHTHALMIC at 10:46

## 2025-05-22 RX ADMIN — APREPITANT 40 MG: 40 CAPSULE ORAL at 09:30

## 2025-05-22 RX ADMIN — MIDAZOLAM HYDROCHLORIDE 2 MG: 1 INJECTION, SOLUTION INTRAMUSCULAR; INTRAVENOUS at 13:50

## 2025-05-22 RX ADMIN — OXYCODONE HYDROCHLORIDE 5 MG: 5 TABLET ORAL at 13:24

## 2025-05-22 RX ADMIN — SODIUM CHLORIDE, POTASSIUM CHLORIDE, SODIUM LACTATE AND CALCIUM CHLORIDE: 600; 310; 30; 20 INJECTION, SOLUTION INTRAVENOUS at 10:35

## 2025-05-22 RX ADMIN — FENTANYL CITRATE 50 MCG: 50 INJECTION, SOLUTION INTRAMUSCULAR; INTRAVENOUS at 10:46

## 2025-05-22 RX ADMIN — DEXAMETHASONE SODIUM PHOSPHATE 8 MG: 4 INJECTION, SOLUTION INTRAMUSCULAR; INTRAVENOUS at 10:54

## 2025-05-22 RX ADMIN — HYDROMORPHONE HYDROCHLORIDE 0.5 MG: 1 INJECTION, SOLUTION INTRAMUSCULAR; INTRAVENOUS; SUBCUTANEOUS at 13:46

## 2025-05-22 RX ADMIN — KETOROLAC TROMETHAMINE 30 MG: 30 INJECTION, SOLUTION INTRAMUSCULAR at 12:36

## 2025-05-22 RX ADMIN — SUGAMMADEX 200 MG: 100 INJECTION, SOLUTION INTRAVENOUS at 12:50

## 2025-05-22 RX ADMIN — ONDANSETRON 4 MG: 2 INJECTION, SOLUTION INTRAMUSCULAR; INTRAVENOUS at 12:36

## 2025-05-22 RX ADMIN — Medication 20 MG: at 13:50

## 2025-05-22 RX ADMIN — HYDROMORPHONE HYDROCHLORIDE 0.5 MG: 1 INJECTION, SOLUTION INTRAMUSCULAR; INTRAVENOUS; SUBCUTANEOUS at 14:46

## 2025-05-22 RX ADMIN — PROPOFOL 200 MG: 10 INJECTION, EMULSION INTRAVENOUS at 10:46

## 2025-05-22 RX ADMIN — SCOLOPAMINE TRANSDERMAL SYSTEM 1 PATCH: 1 PATCH, EXTENDED RELEASE TRANSDERMAL at 09:31

## 2025-05-22 RX ADMIN — PROMETHAZINE HYDROCHLORIDE 12.5 MG: 25 INJECTION INTRAMUSCULAR; INTRAVENOUS at 13:47

## 2025-05-22 RX ADMIN — Medication 5 L/MIN: at 14:30

## 2025-05-22 RX ADMIN — Medication 200 MCG: at 12:36

## 2025-05-22 RX ADMIN — FENTANYL CITRATE 50 MCG: 50 INJECTION, SOLUTION INTRAMUSCULAR; INTRAVENOUS at 12:52

## 2025-05-22 RX ADMIN — FENTANYL CITRATE 100 MCG: 50 INJECTION, SOLUTION INTRAMUSCULAR; INTRAVENOUS at 10:07

## 2025-05-22 RX ADMIN — ACETAMINOPHEN 975 MG: 325 TABLET, FILM COATED ORAL at 09:16

## 2025-05-22 RX ADMIN — MIDAZOLAM HYDROCHLORIDE 5 MG: 1 INJECTION, SOLUTION INTRAMUSCULAR; INTRAVENOUS at 10:07

## 2025-05-22 RX ADMIN — ONDANSETRON 4 MG: 2 INJECTION, SOLUTION INTRAMUSCULAR; INTRAVENOUS at 13:23

## 2025-05-22 RX ADMIN — Medication 2 L/MIN: at 16:23

## 2025-05-22 RX ADMIN — CEFAZOLIN 2 G: 1 INJECTION, POWDER, FOR SOLUTION INTRAMUSCULAR; INTRAVENOUS at 10:46

## 2025-05-22 RX ADMIN — Medication 2 L/MIN: at 15:00

## 2025-05-22 RX ADMIN — METHOCARBAMOL 1000 MG: 1000 INJECTION, SOLUTION INTRAMUSCULAR; INTRAVENOUS at 12:36

## 2025-05-22 RX ADMIN — Medication 8 L/MIN: at 13:02

## 2025-05-22 ASSESSMENT — PAIN SCALES - GENERAL
PAINLEVEL_OUTOF10: 8
PAINLEVEL_OUTOF10: 10 - WORST POSSIBLE PAIN
PAINLEVEL_OUTOF10: 7
PAINLEVEL_OUTOF10: 9
PAINLEVEL_OUTOF10: 8
PAINLEVEL_OUTOF10: 7
PAINLEVEL_OUTOF10: 6
PAINLEVEL_OUTOF10: 10 - WORST POSSIBLE PAIN
PAINLEVEL_OUTOF10: 6
PAINLEVEL_OUTOF10: 5 - MODERATE PAIN
PAINLEVEL_OUTOF10: 6
PAINLEVEL_OUTOF10: 5 - MODERATE PAIN
PAINLEVEL_OUTOF10: 6
PAINLEVEL_OUTOF10: 6
PAINLEVEL_OUTOF10: 9
PAINLEVEL_OUTOF10: 9
PAINLEVEL_OUTOF10: 6
PAINLEVEL_OUTOF10: 7

## 2025-05-22 ASSESSMENT — PAIN - FUNCTIONAL ASSESSMENT
PAIN_FUNCTIONAL_ASSESSMENT: 0-10
PAIN_FUNCTIONAL_ASSESSMENT: UNABLE TO SELF-REPORT
PAIN_FUNCTIONAL_ASSESSMENT: UNABLE TO SELF-REPORT
PAIN_FUNCTIONAL_ASSESSMENT: 0-10
PAIN_FUNCTIONAL_ASSESSMENT: 0-10
PAIN_FUNCTIONAL_ASSESSMENT: UNABLE TO SELF-REPORT
PAIN_FUNCTIONAL_ASSESSMENT: UNABLE TO SELF-REPORT
PAIN_FUNCTIONAL_ASSESSMENT: 0-10

## 2025-05-22 NOTE — PERIOPERATIVE NURSING NOTE
Procedure: Left Hip Arthroscopy; Labral repair; Rim Trim; Osteoplasty; Capsular Plication - Left  Incision: left hip incision dressed with xeroform, fluffs, abd, foam tape.  Anesthesia type (i.e. general, regional, MAC): general  Nerve block (if applicable): yes  Estimated blood loss (EBL) in OR: 50 ml  Orientation/mental status: axo x4  IV site(s), and drips/fluids currently infusing right hand  PO status (last oral intake): sipping water and eating felisha crackers  O2 requirements: 2lpm nc  Current pain level & last pain/nausea medication dose/time given:   1323 0.5mg Dilaudid  1323 4mg Zofran  1325 5mg Oxycodone  1335 0.5mg Dilaudid  1346 0.5mg Dilaudid  1346 12.5mg Phenergan  1352 20 Ketamine  1352 2 Versed  1446 0.5mg Dilaudid  1454 0.5mg Dilaudid  1549 975mg Tylenol  Last void/Piedra in place: voided prior to surgery  Equipment sent with patient (i.e. Polar Cube, TENs unit, walker, crutches): crutches  SCDs on (yes/no): yes  Belongings sent with patient:  glasses, brace, cellphone, crutches  Emergency contact (name, relationship, phone number): Obdulio (s/o) [phone]  PMH: migraines, kidney stones, cervical disc, lumbar disc  PSH: breast surgery, , d&c

## 2025-05-22 NOTE — PROGRESS NOTES
Physical Therapy    Patient Name: Kitty Dunbar  MRN: [id number]  Department: [facility] OR  Room: [facility]  Today's Date: 5/22/2025       Assessment/Plan   Assessment and Plan  PT Plan:  (1 session of gait training only)  Device Issued:  (Pt has crutches already, but PT adjusted height and  bars accordingly.)  Recommendations: No further PT needs.  End of Session Communication: Bedside nurse  End of Session Patient Position: On cart    Subjective   Info and History:  Surgical Information and Patient History  Date of Surgery: 05/22/25  PT Visit Date: 05/22/25  Reason for Referral: gait training  Surgical Procedure: Left Hip Arthroscopy; Labral Repair; Rim Trim; Osteoplasty; Capsular Plication  History Relevant to Rehab: Medical History[1]  Surgical History[2]  Prior to Session Communication: Bedside nurse  Patient Position Received: On cart  Precautions:  Precautions  LE Weight Bearing Status:  (50% PWB LLE)  Medical Precautions: No known precautions/limitation (Pt seen pre-op.)  Surgical Dressing Intact:  (N/A (pt seen pre-op))   Home Living:  Home Living  Type of Home: House  Lives With: Spouse, Dependent children  Home Adaptive Equipment: Crutches  Home Layout: One level, Laundry in basement (spouse and kids will take of laundry)  Home Access: Stairs to enter without rails  Entrance Stairs-Rails: None  Entrance Stairs-Number of Steps: 3    Objective   Cognition:  Cognition/Behavior  Arousal/Alertness: Appropriate responses to stimuli  Orientation Level: Oriented X4  Following Commands: Follows all commands and directions without difficulty  Participating in Session: Yes  Pain:  Pain Assessment  Pain Assessment: 0-10  0-10 (Numeric) Pain Score: 5 - Moderate pain  Pain Type: Chronic pain  Pain Location: Hip  Pain Orientation: Left  Extremity Assessments:  RUE:   RUE   RUE : Within Functional Limits  LUE:  LUE   LUE: Within Functional Limits  RLE:  RLE   RLE : Within Functional Limits  LLE:  LLE   LLE :  Within Functional Limits    Ambulation/Gait Training:  Ambulation/Gait Training  Ambulation/Gait Training Performed: Yes  Ambulation/Gait Training 1  Surface 1: Level tile  Device 1: Axillary crutches  Assistance 1: Distant supervision, Minimal verbal cues  Comments/Distance (ft) 1: 15' (Pt instructed on proper gait sequencing using B axillary crutches while maintaining 50% PWB LLE. Pt initially not putting weight through LLE, but after more education and cueing, pt was able to demonstrate 50% PWB.)    Stairs:  Stairs  Stairs: Yes  Stairs  Rails 1: None (Comment)  Device 1: Axillary crutches  Assistance 1: Distant supervision, Minimal verbal cues  Comment/Number of Steps 1: 3 (Pt provided education on non-reciprocal pattern with use of B axillary crutches on stairs while maintaining 50% PWB LLW.)      Assessment/Plan   Assessment and Plan  PT Plan:  (1 session of gait training only)  Device Issued:  (Pt has crutches already, but PT adjusted height and  bars accordingly.)  Recommendations: No further PT needs.  End of Session Communication: Bedside nurse  End of Session Patient Position: On cart                  [1]   Past Medical History:  Diagnosis Date    Cervical disc herniation 2006    Kidney stones     Lumbar disc herniation 2006    Migraine    [2]   Past Surgical History:  Procedure Laterality Date    BREAST SURGERY  2015     SECTION, CLASSIC  2008    DILATION AND CURETTAGE OF UTERUS

## 2025-05-22 NOTE — ANESTHESIA PROCEDURE NOTES
Airway  Date/Time: 5/22/2025 10:47 AM  Reason: elective    Airway not difficult    Staffing  Performed: GAEL   Authorized by: David Wheat MD    Performed by: GAEL Monreal  Patient location during procedure: OR    Patient Condition  Indications for airway management: anesthesia  Patient position: sniffing  MILS maintained throughout  Sedation level: deep     Final Airway Details   Preoxygenated: yes  Final airway type: endotracheal airway  Successful airway: ETT  Cuffed: yes   Successful intubation technique: direct laryngoscopy  Blade: Graham  Blade size: #3  ETT size (mm): 7.0  Cormack-Lehane Classification: grade III - view of epiglottis only  Placement verified by: chest auscultation and capnometry   Cuff volume (mL): 8  Measured from: lips  ETT to lips (cm): 21  Number of attempts at approach: 1

## 2025-05-22 NOTE — ANESTHESIA PREPROCEDURE EVALUATION
Patient: Kitty Dunbar    Procedure Information       Date/Time: 05/22/25 1015    Procedure: Left Hip Arthroscopy; Labral Repair; Rim Trim; Osteoplasty; Capsular Plication (Left: Hip)    Location: [facility] OR 18 / Virtual [facility] OR    Surgeons: David Olmedo MD          41 yo F hx anxiety, migraines, hypothyroid.    Relevant Problems   Neuro   (+) Anxiety   (+) Major depressive disorder, recurrent severe without psychotic features (Multi)   (+) Mixed anxiety depressive disorder   (+) Panic attacks   (+) Situational depression   (+) Situational mixed anxiety and depressive disorder      GI   (+) Irritable bowel syndrome with constipation      /Renal   (+) Kidney stones      Endocrine   (+) Hypothyroidism       Clinical information reviewed:   Tobacco  Allergies  Meds   Med Hx  Surg Hx  OB Status  Fam Hx  Soc   Hx        NPO Detail:  NPO/Void Status  Date of Last Liquid: 05/22/25  Time of Last Liquid: 0600  Date of Last Solid: 05/21/25  Time of Last Solid: 2230         Physical Exam    Airway  Mallampati: III  TM distance: >3 FB  Neck ROM: full  Mouth opening: 3 or more finger widths     Cardiovascular Rate: normal     Dental - normal exam     Pulmonary - normal exam   Abdominal            Anesthesia Plan    History of general anesthesia?: yes  History of complications of general anesthesia?: no    ASA 2     general     intravenous induction   Postoperative pain plan includes opioids.  Anesthetic plan and risks discussed with patient.

## 2025-05-22 NOTE — PERIOPERATIVE NURSING NOTE
1348 Dr Wheat at bedside medicating pt with ketamine for pain.     1446 Pt medicated for 9/10 pain.     1454 Pt medicated for 9/10 pain.     1500  Pt placed on 2lpm nc for oxygen saturation of 87%.     1510 Dr Wheat notified of pt pain level despite medication. No new orders given at this time.    1730 Discharge instructions provided to pt and family. Educated on medications, effects of anesthesia, and homecoming care. Pt and family verbalizing understanding of all instructions provided at this time. All questions and concerns answered. Pt given contact information for provider.     1740 Pt assisted with dressing with help of family. IV removed dressing applied.

## 2025-05-22 NOTE — ANESTHESIA POSTPROCEDURE EVALUATION
Patient: Kitty Dunbar    Procedure Summary       Date: 05/22/25 Room / [facility]: [facility] / [facility]    Anesthesia Start: 1034 Anesthesia Stop: 1304    Procedure: Left Hip Arthroscopy; Labral Repair; Rim Trim; Osteoplasty; Capsular Plication (Left: Hip) Diagnosis:       Tear of left acetabular labrum, initial encounter      Femoroacetabular impingement of left hip      Loose body in left hip      (Tear of left acetabular labrum, initial encounter [S73.192A])      (Femoroacetabular impingement of left hip [M25.852])      (Loose body in left hip [M24.052])    Surgeons: David Olmedo MD Responsible Provider: David Wheat MD    Anesthesia Type: general ASA Status: 2            Anesthesia Type: general    Vitals Value Taken Time   /80 05/22/25 17:00   Temp 36.5 °C (97.7 °F) 05/22/25 17:00   Pulse 70 05/22/25 17:00   Resp 14 05/22/25 17:00   SpO2 97 % 05/22/25 17:00       Anesthesia Post Evaluation    Patient participation: complete - patient participated  Level of consciousness: awake  Pain management: satisfactory to patient  Airway patency: patent  Cardiovascular status: acceptable and hemodynamically stable  Respiratory status: acceptable and nonlabored ventilation  Hydration status: balanced  Postoperative Nausea and Vomiting: none  Comments: Prior to discharge pain control was tolerable         No notable events documented.

## 2025-05-22 NOTE — ANESTHESIA PROCEDURE NOTES
Peripheral Block    Patient location during procedure: pre-op  Medication administered at: 5/22/2025 10:05 AM  End time: 5/22/2025 10:12 AM  Reason for block: at surgeon's request and post-op pain management  Staffing  Performed: fellow and attending   Authorized by: David Wheat MD    Performed by: Alberta Han MD  Preanesthetic Checklist  Completed: patient identified, IV checked, site marked, risks and benefits discussed, surgical consent, monitors and equipment checked, pre-op evaluation and timeout performed   Timeout performed at: 5/22/2025 10:06 AM  Peripheral Block  Patient position: laying flat  Prep: ChloraPrep  Patient monitoring: heart rate and continuous pulse ox  Block type: PENG  Laterality: left  Injection technique: single-shot  Guidance: ultrasound guided  Local infiltration: lidocaine  Infiltration strength: 1 %  Dose: 1 mL  Needle  Needle type: Tuohy   Needle gauge: 22 G  Needle length: 8 cm  Needle localization: ultrasound guidance     image stored in chart  Assessment  Injection assessment: negative aspiration for heme, no paresthesia on injection, incremental injection and local visualized surrounding nerve on ultrasound  Paresthesia pain: none  Heart rate change: no  Slow fractionated injection: yes  Additional Notes  PENG Block:    Prior to procedure: Risks, benefits and alternatives were discussed. Informed consent was obtained from the patient for the block. ASA monitors were applied and patient prepped with chlorhexidine. Timeout was performed.    Ultrasound guidance was used to visualize the psoas tendon, femoral artery, femoral vein and surrounding structures. Skin was numbed with 1% lidocaine. Needle was introduced with visualization of the needle tip throughout the entire procedure. Aspiration negative. A total of 30 ml 0.25% ropivacaine was given on the left side. Patient tolerated procedure without any complications.

## 2025-05-23 ENCOUNTER — PHARMACY VISIT (OUTPATIENT)
Dept: PHARMACY | Facility: CLINIC | Age: 40
End: 2025-05-23
Payer: COMMERCIAL

## 2025-05-23 ENCOUNTER — EVALUATION (OUTPATIENT)
Dept: PHYSICAL THERAPY | Facility: CLINIC | Age: 40
End: 2025-05-23
Payer: COMMERCIAL

## 2025-05-23 DIAGNOSIS — S73.192D TEAR OF LEFT ACETABULAR LABRUM, SUBSEQUENT ENCOUNTER: ICD-10-CM

## 2025-05-23 DIAGNOSIS — S76.912D STRAIN OF HIP AND THIGH, LEFT, SUBSEQUENT ENCOUNTER: ICD-10-CM

## 2025-05-23 DIAGNOSIS — S76.012D STRAIN OF HIP AND THIGH, LEFT, SUBSEQUENT ENCOUNTER: ICD-10-CM

## 2025-05-23 PROCEDURE — 97140 MANUAL THERAPY 1/> REGIONS: CPT | Mod: GP | Performed by: PHYSICAL THERAPIST

## 2025-05-23 PROCEDURE — 97161 PT EVAL LOW COMPLEX 20 MIN: CPT | Mod: GP | Performed by: PHYSICAL THERAPIST

## 2025-05-23 NOTE — PROGRESS NOTES
Physical Therapy  Physical Therapy Orthopedic Evaluation    Patient Name: Kitty Dunbar  MRN: [id number]  Today's Date: 5/23/2025  Time Calculation  Start Time: 1315  Stop Time: 1350  Time Calculation (min): 35 min    PT Evaluation Time Entry  PT Evaluation (Low) Time Entry: 15  PT Therapeutic Procedures Time Entry  Manual Therapy Time Entry: 10  Therapeutic Exercise Time Entry: 8       Insurance:  Visit number: 1  Authorization info: required  Insurance Type: Payor:  EMPLOYEE MEDICAL PLAN / Plan:  EMPLOYEE MEDICAL PLAN TRADITIONAL  / Product Type: *No Product type* /      Current Problem     Problem List Items Addressed This Visit           ICD-10-CM    Strain of left hip S76.012A    Tear of left acetabular labrum S73.192A    Relevant Orders    Follow Up In Physical Therapy       Surgery:David Olmedo MD 5/22/25 Left Hip Arthroscopy; Labral Repair; Rim Trim; Osteoplasty; Capsular Plication       Referred by: David Olmedo MD      Precautions:       Subjective:   Subjective   Chief Complaint: L hip surgery  Onset: original injury December 10, 2024  MARGARITO: she was stepping down from a stepstool, stepping into a raised dog bowel with her left foot, twisted on the leg and fell backwards and landed on her backside     General:     Current Condition:   Better    Pain:     Location: L hip  Rating: Best-3, Current-5, Worst-8  Description: dull ache, sharp at times  Aggravating Factors:  bending, lifting, walking  Relieving Factors:  rest, laying down    Relevant Information (PMH & Previous Tests/Imaging): + imaging prior to surgery    Previous Interventions/Treatments: Physical Therapy and Injections    Prior Level of Function (PLOF)  Patient previously independent with all ADLs  Exercise/Physical Activity: Active with children  Work/School: Respiratory therapist    Patients Living Environment: Reviewed and no concern  Primary Language: English  Patient's Goal(s) for Therapy: reduce pain and improve pain free      Red Flags: Do you have any of the following? No  Fever/chills, unexplained weight changes, dizziness/fainting, unexplained change in bowel or bladder functions, unexplained malaise or muscle weakness, night pain/sweats, numbness or tingling    Objective:  Objective     Palpation/Observation  Presents with T scope and alicia axillary crutches   Posture  Neutral LE alignment supine  Special Testing    ROM  5/23/2025  L hip flex 60deg  IR 30deg, ADD 30deg  Knee and ankle WNL  Open end feel with all motion  Strength  5/23/2025  3/5 knee , ankle, formal testing not performed secondary to surgery  Sensation  Limited sensation anterior pain  Reflexes      Transfers: mod UE assist, external support for L LE movement   Gait: non reciprocal flat foot pattern with crutches    Outcome Measures:  Other Measures  Lower Extremity Funtional Score (LEFS): 15   5/23/2025  Treatments:  Ther-EX:  - Supine Gluteal Sets  - 3 x daily - 1 sets - 15 reps - 5 hold  - Supine Transversus Abdominis Bracing - Hands on Stomach  - 3 x daily - 1 sets - 15 reps - 5 hold  - Supine Quad Set  - 3 x daily - 1 sets - 15 reps - 5 hold  - Supine Ankle Pumps  - 15 x daily - 3 sets - 10 reps  - Seated Long Arc Quad  - 3 x daily - 2 sets - 10 reps  - Supine Knee Extension Strengthening  - 3 x daily - 2 sets - 10 reps  - Standing Knee Flexion AROM with Chair Support  - 3 x daily - 2 sets - 10 reps    Manual:  PROM L Hip  Modalities:  Discussed ice    PT Evaluation Time Entry  PT Evaluation (Low) Time Entry: 15  PT Therapeutic Procedures Time Entry  Manual Therapy Time Entry: 10  Therapeutic Exercise Time Entry: 8       EDUCATION: Home exercise program, plan of care, activity modifications, pain management, and injury pathology     Code: [email or web address]    Medical History Form: Reviewed (scanned into chart)  Reviewed medical form, [place], Falls risk, Mu-ism beliefs, PHQ     Screening  Frequency  Date Last Completed   Spiritual and Cultural Beliefs   Screening  each visit or episode of care 5/22/2025   Falls Risk Screening every ambulatory visit    Pain Screening annually at primary care visit  5/13/2025   Domestic Violence screening annually at primary care visit 5/13/2025   Depression Screening annually in the primary care setting 5/13/2025   Suicide Risk Screening annually in the primary care setting 5/22/2025   Nutrition and Food Insecurity   Screening at least annually at primary care visit     Key Learner annually in the primary care setting 5/22/2025   Drug Screen  5/22/2025  8:59 AM   Alcohol Screen  5/22/2025  8:59 AM   Advance Directive  5/13/2025     Time Calculation  Start Time: 1315  Stop Time: 1350  Time Calculation (min): 35 min  PT Evaluation Time Entry  PT Evaluation (Low) Time Entry: 15 PT Therapeutic Procedures Time Entry  Manual Therapy Time Entry: 10  Therapeutic Exercise Time Entry: 8          Assessment: Patient presents with signs and symptoms consistent with L hip labral repair, resulting in limited participation in pain-free ADLs and inability to perform at their prior level of function. Pt would benefit from physical therapy to address the impairments found & listed previously in the objective section in order to return to safe and pain-free ADLs and prior level of function.     Complexity:Low  Prognosis: good  Response to care: good  Clinical Presentation: Stable and/or uncomplicated characteristics  Personal Factors: None    Problem List:  Pain  function  Mobility  Strength  Plan:     Planned Interventions include: therapeutic exercise, self-care home management, manual therapy, therapeutic activities, gait training, neuromuscular coordination, vasopneumatic, dry needling, aquatic therapy    Next Treatment:bike, stm, bridges, hip add, clamshell isometric  Frequency: 2 x Week  Duration: 12 Weeks  Goals: Set and discussed today  6 weeks    1. Pt will be independent with HEP.  2. Pt will demonstrated full pain free flexion hip ROM.  3.  Pt will  demonstrated gross 4/5 L LE strength.  4.  Pt will report at least 85% on HOS ADL scale.  5.  Pt will demo DL and SL squats without deviations, demonstrating good body mechanics and alignment.    12 weeks  6.  Pt will report no pain with cooking, cleaning, sleep, ADL's, and driving.  7.  Pt will ambulate for at least 45' without c/o pain.  8. Pt will participate in a return to plyometrics/joint loading program to allow safe return to run.  9.  Pt will demonstrated gross 4+/5 L LE strength.    Plan of care was developed with input and agreement by the patient      Omero Greene, PT

## 2025-05-27 ENCOUNTER — TREATMENT (OUTPATIENT)
Dept: PHYSICAL THERAPY | Facility: CLINIC | Age: 40
End: 2025-05-27
Payer: COMMERCIAL

## 2025-05-27 DIAGNOSIS — S76.012D STRAIN OF HIP AND THIGH, LEFT, SUBSEQUENT ENCOUNTER: ICD-10-CM

## 2025-05-27 DIAGNOSIS — S76.912D STRAIN OF HIP AND THIGH, LEFT, SUBSEQUENT ENCOUNTER: ICD-10-CM

## 2025-05-27 PROCEDURE — 97140 MANUAL THERAPY 1/> REGIONS: CPT | Mod: GP | Performed by: PHYSICAL THERAPIST

## 2025-05-27 PROCEDURE — RXMED WILLOW AMBULATORY MEDICATION CHARGE

## 2025-05-27 PROCEDURE — 97110 THERAPEUTIC EXERCISES: CPT | Mod: GP | Performed by: PHYSICAL THERAPIST

## 2025-05-27 NOTE — PROGRESS NOTES
Physical Therapy  Physical Therapy Orthopedic Evaluation    Patient Name: Kitty Dunbar  MRN: [id number]  Today's Date: 5/27/2025                  Insurance:  Visit number: 1  Authorization info: required  Insurance Type: Payor:  EMPLOYEE MEDICAL PLAN / Plan:  EMPLOYEE MEDICAL PLAN TRADITIONAL  / Product Type: *No Product type* /      Current Problem     Problem List Items Addressed This Visit           ICD-10-CM    Strain of left hip S76.012A       Surgery:Dvaid Olmedo MD 5/22/25 Left Hip Arthroscopy; Labral Repair; Rim Trim; Osteoplasty; Capsular Plication       Referred by: David Olmedo MD      Precautions:       Subjective:   Subjective   Chief Complaint: L hip surgery  Onset: original injury December 10, 2024  MARGARITO: she was stepping down from a stepstool, stepping into a raised dog bowel with her left foot, twisted on the leg and fell backwards and landed on her backside     General:     Current Condition:   Better    Pain:     Location: L hip  Rating: Best-3, Current-5, Worst-8  Description: dull ache, sharp at times  Aggravating Factors:  bending, lifting, walking  Relieving Factors:  rest, laying down    Relevant Information (PMH & Previous Tests/Imaging): + imaging prior to surgery    Previous Interventions/Treatments: Physical Therapy and Injections    Prior Level of Function (PLOF)  Patient previously independent with all ADLs  Exercise/Physical Activity: Active with children  Work/School: Respiratory therapist    Patients Living Environment: Reviewed and no concern  Primary Language: English  Patient's Goal(s) for Therapy: reduce pain and improve pain free     Red Flags: Do you have any of the following? No  Fever/chills, unexplained weight changes, dizziness/fainting, unexplained change in bowel or bladder functions, unexplained malaise or muscle weakness, night pain/sweats, numbness or tingling    Objective:  Objective     Palpation/Observation  Presents with T scope and alicia axillary  "crutches   Posture  Neutral LE alignment supine  Special Testing    ROM  5/27/2025  L hip flex 60deg  IR 30deg, ADD 30deg  Knee and ankle WNL  Open end feel with all motion  Strength  5/27/2025  3/5 knee , ankle, formal testing not performed secondary to surgery  Sensation  Limited sensation anterior pain  Reflexes      Transfers: mod UE assist, external support for L LE movement   Gait: non reciprocal flat foot pattern with crutches    Outcome Measures:      5/27/2025  Treatments:  Ther-EX:  Bike x 5 seat #3 x 5  Bridge x 15  Swiss ball flexion x 15  Hip abd isometric x 15 5\"  Hip add isometric x 15 5\"    Prone 1 pillow abdomen laying 5-10 minutes discussed  Manual:  PROM L Hip, STM quad, hip flexor, glutes, IT band, TFL  Modalities:  Discussed ice               EDUCATION: Home exercise program, plan of care, activity modifications, pain management, and injury pathology     Code: [email or web address]    Medical History Form: Reviewed (scanned into chart)  Reviewed medical form, [place], Falls risk, Jain beliefs, PHQ     Screening  Frequency  Date Last Completed   Spiritual and Cultural Beliefs   Screening each visit or episode of care 5/22/2025   Falls Risk Screening every ambulatory visit    Pain Screening annually at primary care visit  5/13/2025   Domestic Violence screening annually at primary care visit 5/13/2025   Depression Screening annually in the primary care setting 5/13/2025   Suicide Risk Screening annually in the primary care setting 5/22/2025   Nutrition and Food Insecurity   Screening at least annually at primary care visit     Key Learner annually in the primary care setting 5/22/2025   Drug Screen  5/22/2025  8:59 AM   Alcohol Screen  5/22/2025  8:59 AM   Advance Directive  5/13/2025                     Assessment: Patient presents with signs and symptoms consistent with L hip labral repair, resulting in limited participation in pain-free ADLs and inability to perform at their prior level of " function. Pt would benefit from physical therapy to address the impairments found & listed previously in the objective section in order to return to safe and pain-free ADLs and prior level of function.Limited ability to tolerate neutral extension/laying flat, since time of injury to surgery she does present with hip flexor contracture, fatigued with isometric exercise      Complexity:Low  Prognosis: good  Response to care: good  Clinical Presentation: Stable and/or uncomplicated characteristics  Personal Factors: None    Problem List:  Pain  function  Mobility  Strength  Plan:     Planned Interventions include: therapeutic exercise, self-care home management, manual therapy, therapeutic activities, gait training, neuromuscular coordination, vasopneumatic, dry needling, aquatic therapy    Next Treatment:bike, stm, bridges, hip add, clamshell isometric  Frequency: 2 x Week  Duration: 12 Weeks  Goals: Set and discussed today  6 weeks    1. Pt will be independent with HEP.  2. Pt will demonstrated full pain free flexion hip ROM.  3.  Pt will demonstrated gross 4/5 L LE strength.  4.  Pt will report at least 85% on HOS ADL scale.  5.  Pt will demo DL and SL squats without deviations, demonstrating good body mechanics and alignment.    12 weeks  6.  Pt will report no pain with cooking, cleaning, sleep, ADL's, and driving.  7.  Pt will ambulate for at least 45' without c/o pain.  8. Pt will participate in a return to plyometrics/joint loading program to allow safe return to run.  9.  Pt will demonstrated gross 4+/5 L LE strength.    Plan of care was developed with input and agreement by the patient      Omero Greene, PT

## 2025-05-27 NOTE — OP NOTE
Left Hip Arthroscopy; Labral Repair; Rim Trim; Osteoplasty; Capsular Plication (L) Operative Note     Date: 2025  OR Location: [facility] OR    Name: Kitty Dunbar, : 1985, Age: 40 y.o., MRN: [id number], Sex: female    PREOPERATIVE DIAGNOSIS:   1. 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   2. Acetabular labral tear.   3. Intra-articular loose bodies, one of which required separate   cannula for its removal.   4. Mild hip instability noted on exam under anesthesia.       POSTOPERATIVE DIAGNOSIS:   1. 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   2. Acetabular labral tear.   3. Intra-articular loose bodies, one of which required separate   cannula for its removal.   4. Mild hip instability noted on exam under anesthesia.   5. Diffuse grade II chondral thinning acetabulum from 12-3 o'clock 1/4 down the face of the acetabulum  6. Hypertrophic labrum      OPERATION/PROCEDURE:   1. left hip arthroscopy with arthroscopic rim trim subspinous   decompression as a separate and identifiable procedure for pincer and   subspinous impingement.   2. Acetabular labral repair, 3-anchor looped configuration for a tear   extending from the 12 to 3 o'clock position.   3. Intra-articular loose body removal.   4. Femoral osteochondroplasty for CAM lesion.   5. Capsular plication.       SURGEON:   David Olmedo MD.       ASSISTANT(S):   First assistant; Alie Washington PA-C.  Please note that we will be   billing for my physician's assistant as she was critical and   necessary for successful completion of this case including limb   positioning, anchor placement, suture management, and wound closure.   There were no qualified residents available to assist      TRACTION TIME:   Less than 1 hour.       INDICATION FOR PROCEDURE:   Pleasant patient presented to my office with   persistent left-sided hip pain that had failed conservative   management.  Advanced imaging had revealed a labral tear in the    setting of mixed impingement.  Risks and benefits of surgery have   been discussed with the patient including, but not limited to,   bleeding, infection, damage to nerves or blood vessels, need for   further procedures, risks of anesthesia, blood clots, progression of   osteoarthritis, incomplete pain relief, heterotopic ossification,   pudendal nerve palsy, lateral femoral cutaneous nerve palsy,   avascular necrosis requiring hip replacement.  The patient understood   these risks and wished to proceed with the operative intervention.       PROCEDURE AND FINDINGS:   The patient was identified in the preoperative holding area.   Operative extremity was marked with an indelible marker.  Informed   consent was reviewed.  Patient was taken to the operating room where a   time-out was performed verifying correct site, side, procedure, and   our special equipment.   They were placed supine on the operating room   table.  All bony prominences were well padded.  Patient was then prepped   and draped in usual sterile fashion after anesthesia induced was   without difficulty.  Once the patient was prepped and draped, the hip   was distracted via postless technique.  Standard anterolateral   arthroscopy portal was created.  A modified mid anterior portal   created.  A capsulotomy was performed.  Combination of a shaver and   radiofrequency device used to clean off the superior acetabular rim   identifying the patient's pincer and subspinous impingement, this was   taken down with a bur in the standard fashion as a separate and   identifiable procedure.  We then placed 3 anchors; 1 at 12, 1 at 1, 1   at 3 o'clock; these were shuttled around the labrum, tied down with   alternating half hitches in a loop configuration.  We noted excellent   fixation of labrum.  A few intra-articular loose bodies were removed   Arthroscopically as they were encountered at the time of the operation   and a separate cannula was placed for their  removal in order to facilitate  their removal due to size.  Head was reduced.  We noted excellent resolution   of the patient's suction seal.  We identified and protected the   lateral retinacular vessels throughout the remainder of the case.  An   osteoplasty was then performed from the medial to lateral synovial   fold and proximally and distally to the extent of lesion.  Dynamic   examination revealed no residual impingement.  An x-ray showed good   sphericity on multiple views.  Given the patients laxity noted with distraction   on their exam under anesthesia we then performed a capsular plication   of the interportal capsulotomy with multiple sutures, tying these   down with alternating half hitches and clipping them with the knot.   We drained the hip, withdrew the arthroscope, closed the portals with   interrupted sutures, applied a sterile bandage.  The patient was   awoken from anesthesia without complication, transported to PACU in   stable condition.  Postoperative course will be standard hip   arthroscopy protocol.           David Olmedo MD

## 2025-05-30 ENCOUNTER — APPOINTMENT (OUTPATIENT)
Dept: PHYSICAL THERAPY | Facility: CLINIC | Age: 40
End: 2025-05-30
Payer: COMMERCIAL

## 2025-05-30 DIAGNOSIS — S76.012D STRAIN OF HIP AND THIGH, LEFT, SUBSEQUENT ENCOUNTER: ICD-10-CM

## 2025-05-30 DIAGNOSIS — S76.912D STRAIN OF HIP AND THIGH, LEFT, SUBSEQUENT ENCOUNTER: ICD-10-CM

## 2025-05-30 NOTE — H&P
"History Of Present Illness  Kitty Dunbar is a 40 y.o. female presenting with left hip pain MARIELLA.     Past Medical History  She has a past medical history of Cervical disc herniation (), Kidney stones, Lumbar disc herniation (), and Migraine.    Surgical History  She has a past surgical history that includes  section, classic (); Breast surgery (); and Dilation and curettage of uterus.     Social History  She reports that she quit smoking about 7 years ago. Her smoking use included cigarettes. She started smoking about 23 years ago. She has a 4 pack-year smoking history. She has been exposed to tobacco smoke. She has never used smokeless tobacco. She reports current alcohol use of about 4.0 standard drinks of alcohol per week. She reports that she does not currently use drugs.    Family History  Family History[1]     Allergies  Ambien [zolpidem], Effexor [venlafaxine], Nickel, Shellfish derived, Sulfa (sulfonamide antibiotics), and Trazodone    Review of Systems     Physical Exam    Left hip pain +FADIR     Last Recorded Vitals  Blood pressure 107/80, pulse 76, temperature 36.5 °C (97.7 °F), temperature source Temporal, resp. rate 14, height 1.626 m (5' 4\"), weight 75 kg (165 lb 5.5 oz), last menstrual period 2025, SpO2 94%.    Relevant Results      Scheduled medications  Scheduled Medications[2]  Continuous medications  Continuous Medications[3]  PRN medications  PRN Medications[4]  No results found for this or any previous visit (from the past 24 hours).    Assessment/Plan   Assessment & Plan  Tear of left acetabular labrum    Femoroacetabular impingement of left hip    Loose body in left hip      Plan left hip scope       I spent 10 minutes in the professional and overall care of this patient.      David Olmedo MD         [1] No family history on file.  [2] [3] [4]   "

## 2025-06-03 ENCOUNTER — TREATMENT (OUTPATIENT)
Dept: PHYSICAL THERAPY | Facility: CLINIC | Age: 40
End: 2025-06-03
Payer: COMMERCIAL

## 2025-06-03 DIAGNOSIS — S73.192D TEAR OF LEFT ACETABULAR LABRUM, SUBSEQUENT ENCOUNTER: ICD-10-CM

## 2025-06-03 DIAGNOSIS — S76.012D STRAIN OF HIP AND THIGH, LEFT, SUBSEQUENT ENCOUNTER: ICD-10-CM

## 2025-06-03 DIAGNOSIS — S76.912D STRAIN OF HIP AND THIGH, LEFT, SUBSEQUENT ENCOUNTER: ICD-10-CM

## 2025-06-03 PROCEDURE — 97140 MANUAL THERAPY 1/> REGIONS: CPT | Mod: GP | Performed by: PHYSICAL THERAPIST

## 2025-06-03 NOTE — PROGRESS NOTES
Physical Therapy  Physical Therapy Orthopedic Evaluation    Patient Name: Kitty Dunbar  MRN: [id number]  Today's Date: 6/3/2025  Time Calculation  Start Time: 1036  Stop Time: 1125  Time Calculation (min): 49 min       PT Therapeutic Procedures Time Entry  Manual Therapy Time Entry: 40       Insurance:  Visit number: 1  Authorization info: required  Insurance Type: Payor:  EMPLOYEE MEDICAL PLAN / Plan:  EMPLOYEE MEDICAL PLAN TRADITIONAL  / Product Type: *No Product type* /      Current Problem     Problem List Items Addressed This Visit           ICD-10-CM    Strain of left hip S76.012A       Surgery:David Olmedo MD 5/22/25 Left Hip Arthroscopy; Labral Repair; Rim Trim; Osteoplasty; Capsular Plication       Referred by: David Olmedo MD      Precautions:       Subjective:   Subjective reports that she is still very sore.  High pain levels.  Has not been taking her pain medication because she wanted to save the last few pills.  She did find that she was more sore after going to her son's graduation which required a lot of sitting and walking.  She also did a few graduation parties over the weekend and also was present for 3 soccer games.  She tried to do some walking but does notice increased soreness and pain across the front of her leg and quad.  She does report occasional popping symptoms especially with getting dressed and getting into her car      General:  Chief Complaint: L hip surgery  Onset: original injury December 10, 2024  MARGARITO: she was stepping down from a stepstool, stepping into a raised dog bowel with her left foot, twisted on the leg and fell backwards and landed on her backside   Pain:     Location: L hip  Rating: Best-3, Current-5, Worst-8  Description: dull ache, sharp at times  Aggravating Factors:  bending, lifting, walking  Relieving Factors:  rest, laying down    Relevant Information (PMH & Previous Tests/Imaging): + imaging prior to surgery    Previous  Interventions/Treatments: Physical Therapy and Injections    Prior Level of Function (PLOF)  Patient previously independent with all ADLs  Exercise/Physical Activity: Active with children  Work/School: Respiratory therapist    Patients Living Environment: Reviewed and no concern  Primary Language: English  Patient's Goal(s) for Therapy: reduce pain and improve pain free     Red Flags: Do you have any of the following? No  Fever/chills, unexplained weight changes, dizziness/fainting, unexplained change in bowel or bladder functions, unexplained malaise or muscle weakness, night pain/sweats, numbness or tingling    Objective:  Objective     Palpation/Observation  Presents with T scope and alicia axillary crutches   Posture  Neutral LE alignment supine  Special Testing    ROM  6/3/2025  Presents to therapy flexed posture 30 degree hip flexion.  Unable initially to lay flat secondary to pain symptoms and hip flexor spasming  Strength  6/3/2025  3/5 knee , ankle, formal testing not performed secondary to surgery  Sensation  Limited sensation anterior pain  Reflexes      Transfers: mod UE assist, external support for L LE movement   Gait: non reciprocal flat foot pattern with crutches      Treatments:  Ther-EX:  Reviewed isometrics    Continue to emphasize activity and load management decreasing standing and walking to allow her inflammation to decrease  Manual:  Soft tissue techniques hip flexor quad glutes and adductor area.  Initially utilized 2 pillows for prop, decreased to 1 pillow, then leg was able to lay flat with patient comfortably  Modalities:  Discussed ice       PT Therapeutic Procedures Time Entry  Manual Therapy Time Entry: 40       EDUCATION: Home exercise program, plan of care, activity modifications, pain management, and injury pathology     Code: [id number]    Medical History Form: Reviewed (scanned into chart)  Reviewed medical form, [place], Falls risk, Faith beliefs, PHQ     Screening  Frequency   Date Last Completed   Spiritual and Cultural Beliefs   Screening each visit or episode of care 5/22/2025   Falls Risk Screening every ambulatory visit    Pain Screening annually at primary care visit  5/13/2025   Domestic Violence screening annually at primary care visit 5/13/2025   Depression Screening annually in the primary care setting 5/13/2025   Suicide Risk Screening annually in the primary care setting 5/22/2025   Nutrition and Food Insecurity   Screening at least annually at primary care visit     Key Learner annually in the primary care setting 5/22/2025   Drug Screen  5/22/2025  8:59 AM   Alcohol Screen  5/22/2025  8:59 AM   Advance Directive  5/13/2025     Time Calculation  Start Time: 1036  Stop Time: 1125  Time Calculation (min): 49 min    PT Therapeutic Procedures Time Entry  Manual Therapy Time Entry: 40          Assessment: Patient presents with signs and symptoms consistent with L hip labral repair, resulting in limited participation in pain-free ADLs and inability to perform at their prior level of function. Pt would benefit from physical therapy to address the impairments found & listed previously in the objective section in order to return to safe and pain-free ADLs and prior level of function.Limited ability to tolerate neutral extension/laying flat, since time of injury to surgery she does present with hip flexor contracture, fatigued with isometric exercise.  Emphasis today on manual therapy to decrease pain symptoms and decreased spasming through the anterior hip flexor.  Continues to be challenged secondary to high pain symptoms.  Symptoms were present for greater than 6 months prior to surgery with increased hip flexor spasming and discomfort.  Increased inflammation from activity over the last week.  Symptoms were more manageable at the end of today's treatment session and she does have a good understanding of the importance of standing upright, laying flat, and isometric exercise.  She  will have a follow-up with the surgeons group tomorrow     Complexity:Low  Prognosis: good  Response to care: good  Clinical Presentation: Stable and/or uncomplicated characteristics  Personal Factors: None    Problem List:  Pain  function  Mobility  Strength  Plan:     Planned Interventions include: therapeutic exercise, self-care home management, manual therapy, therapeutic activities, gait training, neuromuscular coordination, vasopneumatic, dry needling, aquatic therapy    Next Treatment:bike, stm, bridges, hip add, clamshell isometric  Frequency: 2 x Week  Duration: 12 Weeks  Goals: Set and discussed today  6 weeks    1. Pt will be independent with HEP.  2. Pt will demonstrated full pain free flexion hip ROM.  3.  Pt will demonstrated gross 4/5 L LE strength.  4.  Pt will report at least 85% on HOS ADL scale.  5.  Pt will demo DL and SL squats without deviations, demonstrating good body mechanics and alignment.    12 weeks  6.  Pt will report no pain with cooking, cleaning, sleep, ADL's, and driving.  7.  Pt will ambulate for at least 45' without c/o pain.  8. Pt will participate in a return to plyometrics/joint loading program to allow safe return to run.  9.  Pt will demonstrated gross 4+/5 L LE strength.    Plan of care was developed with input and agreement by the patient      Omero Greene, PT

## 2025-06-04 ENCOUNTER — OFFICE VISIT (OUTPATIENT)
Dept: ORTHOPEDIC SURGERY | Facility: HOSPITAL | Age: 40
End: 2025-06-04
Payer: COMMERCIAL

## 2025-06-04 DIAGNOSIS — S73.192D TEAR OF ACETABULAR LABRUM, LEFT, SUBSEQUENT ENCOUNTER: Primary | ICD-10-CM

## 2025-06-04 PROCEDURE — 1036F TOBACCO NON-USER: CPT | Performed by: SPECIALIST/TECHNOLOGIST

## 2025-06-04 PROCEDURE — 99213 OFFICE O/P EST LOW 20 MIN: CPT | Performed by: SPECIALIST/TECHNOLOGIST

## 2025-06-04 PROCEDURE — RXMED WILLOW AMBULATORY MEDICATION CHARGE

## 2025-06-04 PROCEDURE — 99024 POSTOP FOLLOW-UP VISIT: CPT | Performed by: SPECIALIST/TECHNOLOGIST

## 2025-06-04 RX ORDER — DOXYCYCLINE 100 MG/1
100 TABLET ORAL 2 TIMES DAILY
Qty: 20 TABLET | Refills: 0 | Status: SHIPPED | OUTPATIENT
Start: 2025-06-04 | End: 2025-06-16

## 2025-06-04 RX ORDER — METHOCARBAMOL 750 MG/1
750 TABLET, FILM COATED ORAL 3 TIMES DAILY
Qty: 30 TABLET | Refills: 0 | Status: SHIPPED | OUTPATIENT
Start: 2025-06-04 | End: 2025-06-16

## 2025-06-04 RX ORDER — OXYCODONE AND ACETAMINOPHEN 5; 325 MG/1; MG/1
1 TABLET ORAL EVERY 6 HOURS PRN
Qty: 5 TABLET | Refills: 0 | Status: SHIPPED | OUTPATIENT
Start: 2025-06-04

## 2025-06-04 NOTE — PROGRESS NOTES
The patient returns status post 2 weeks left hip arthroscopy on 5/22/2025.  Overall she is doing okay.  She does report some increased soreness and discomfort over the anterior hip.  She states she is still having some difficulty letting her leg go fully into extension.  She has been using her pain medicine intermittently as needed. They have no evidence of lower extremity DVT.  Negative Homans.  Incisions are clean and dry.  Healing well.  Sutures were removed today.  There was some redness around the proximal incision without active drainage.  We agreed upon twice daily dry dressing changes for the proximal incision.  Steri-Strips applied to her other incision sites which were healing nicely.  She should avoid getting the proximal incision wet in the shower.  Avoid submerging in a hot tub, bathtub, swimming pool or directly under the shower.  Their pain is appropriate.  Range of motion is within normal limits.    Continue therapy per protocol at [facility].  She does not feel that she is ready to discontinue her brace or crutches at this point which is reasonable.  She may begin to wean herself off the crutches and discontinue her brace when she feels comfortable do so.  We refilled her Robaxin as she is having some muscle spasms in the anterior thigh, she was provided with 5 more doses of the postoperative pain medication, Percocet, to use as needed.  In regards to the proximal incision we started her on doxycycline twice daily for the next 14 days, secondary to sulfa allergy.  Additionally will begin meloxicam 15 mg daily with food.  She states she has some of this at home.  Continue with postoperative protocol.  I will plan to see them back 6 weeks from surgery.  Goal is to get back to symmetric and pain-free range of motion compared to her nonoperative right hip.  She is in agreement the plan.  Questions are answered.    Dr. David Olmedo met with and examined the patient as well and formulated the  treatment plan.        Cesar Antonio PA-C

## 2025-06-05 ENCOUNTER — APPOINTMENT (OUTPATIENT)
Dept: PHYSICAL THERAPY | Facility: CLINIC | Age: 40
End: 2025-06-05
Payer: COMMERCIAL

## 2025-06-05 DIAGNOSIS — S76.012D STRAIN OF HIP AND THIGH, LEFT, SUBSEQUENT ENCOUNTER: ICD-10-CM

## 2025-06-05 DIAGNOSIS — S76.912D STRAIN OF HIP AND THIGH, LEFT, SUBSEQUENT ENCOUNTER: ICD-10-CM

## 2025-06-06 ENCOUNTER — PHARMACY VISIT (OUTPATIENT)
Dept: PHARMACY | Facility: CLINIC | Age: 40
End: 2025-06-06
Payer: COMMERCIAL

## 2025-06-10 ENCOUNTER — TREATMENT (OUTPATIENT)
Dept: PHYSICAL THERAPY | Facility: CLINIC | Age: 40
End: 2025-06-10
Payer: COMMERCIAL

## 2025-06-10 DIAGNOSIS — S76.912D STRAIN OF HIP AND THIGH, LEFT, SUBSEQUENT ENCOUNTER: ICD-10-CM

## 2025-06-10 DIAGNOSIS — S73.192D TEAR OF LEFT ACETABULAR LABRUM, SUBSEQUENT ENCOUNTER: ICD-10-CM

## 2025-06-10 DIAGNOSIS — S76.012D STRAIN OF HIP AND THIGH, LEFT, SUBSEQUENT ENCOUNTER: ICD-10-CM

## 2025-06-10 PROCEDURE — 97110 THERAPEUTIC EXERCISES: CPT | Mod: GP | Performed by: PHYSICAL THERAPIST

## 2025-06-10 PROCEDURE — 97140 MANUAL THERAPY 1/> REGIONS: CPT | Mod: GP | Performed by: PHYSICAL THERAPIST

## 2025-06-10 NOTE — PROGRESS NOTES
Physical Therapy  Physical Therapy Orthopedic Evaluation    Patient Name: Kitty Dunbar  MRN: [id number]  Today's Date: 6/10/2025  Time Calculation  Start Time: 1215  Stop Time: 1308  Time Calculation (min): 53 min       PT Therapeutic Procedures Time Entry  Manual Therapy Time Entry: 28  Therapeutic Exercise Time Entry: 15       Insurance:  Visit number: 3  Authorization APPROVED 30 VISITS (USED 2 VISITS), 1/23/25-8/29/25  Insurance Type: Payor:  EMPLOYEE MEDICAL PLAN / Plan:  EMPLOYEE MEDICAL PLAN TRADITIONAL  / Product Type: *No Product type* /      Current Problem     Problem List Items Addressed This Visit           ICD-10-CM    Strain of left hip S76.012A    Tear of left acetabular labrum S73.192A       Surgery:David Olmedo MD 5/22/25 Left Hip Arthroscopy; Labral Repair; Rim Trim; Osteoplasty; Capsular Plication       Referred by: David Olmedo MD      Precautions:       Subjective:   Subjective reports she is feeling a little better, can move around easier, at times does not use the brace around the house, reports incision are draining puss, she is using anti biotics from ortho      General:  Chief Complaint: L hip surgery  Onset: original injury December 10, 2024  MARGARITO: she was stepping down from a stepstool, stepping into a raised dog bowel with her left foot, twisted on the leg and fell backwards and landed on her backside   Pain:   L hip 7/10    Relevant Information (PMH & Previous Tests/Imaging): + imaging prior to surgery    Previous Interventions/Treatments: Physical Therapy and Injections    Prior Level of Function (PLOF)  Patient previously independent with all ADLs  Exercise/Physical Activity: Active with children  Work/School: Respiratory therapist    Patients Living Environment: Reviewed and no concern  Primary Language: English  Patient's Goal(s) for Therapy: reduce pain and improve pain free     Red Flags: Do you have any of the following? No  Fever/chills, unexplained weight  "changes, dizziness/fainting, unexplained change in bowel or bladder functions, unexplained malaise or muscle weakness, night pain/sweats, numbness or tingling    Objective:  Objective     Palpation/Observation  Presents with T scope and alicia axillary crutches   Posture  Neutral LE alignment supine  Special Testing    ROM  6/10/2025  Presents to therapy flexed posture 30 degree hip flexion.  Unable initially to lay flat secondary to pain symptoms and hip flexor spasming  Strength  6/10/2025  3/5 knee , ankle, formal testing not performed secondary to surgery  Sensation  Limited sensation anterior pain  Reflexes      Transfers: mod UE assist, external support for L LE movement   Gait: non reciprocal flat foot pattern with crutches      Treatments:  Ther-EX:  Bridges with abd belt iso x 15  Prone quad stretch 10 x 10\", lower time secondary to pain  Bike x 6      Gait mechanics with patient working into hip extension with stance phase on L LE  Manual:  Soft tissue techniques hip flexor quad glutes and adductor area.  Initially utilized 2 pillows for prop, decreased to 1 pillow, then leg was able to lay flat with patient comfortably  Modalities:  Discussed ice       PT Therapeutic Procedures Time Entry  Manual Therapy Time Entry: 28  Therapeutic Exercise Time Entry: 15       EDUCATION: Home exercise program, plan of care, activity modifications, pain management, and injury pathology     Code: [email or web address]    Medical History Form: Reviewed (scanned into chart)  Reviewed medical form, [place], Falls risk, Pentecostal beliefs, PHQ     Screening  Frequency  Date Last Completed   Spiritual and Cultural Beliefs   Screening each visit or episode of care 5/22/2025   Falls Risk Screening every ambulatory visit    Pain Screening annually at primary care visit  5/13/2025   Domestic Violence screening annually at primary care visit 5/13/2025   Depression Screening annually in the primary care setting 5/13/2025   Suicide Risk Screening " annually in the primary care setting 5/22/2025   Nutrition and Food Insecurity   Screening at least annually at primary care visit     Key Learner annually in the primary care setting 5/22/2025   Drug Screen  5/22/2025  8:59 AM   Alcohol Screen  5/22/2025  8:59 AM   Advance Directive  5/13/2025     Time Calculation  Start Time: 1215  Stop Time: 1308  Time Calculation (min): 53 min    PT Therapeutic Procedures Time Entry  Manual Therapy Time Entry: 28  Therapeutic Exercise Time Entry: 15          Assessment: Patient presents with signs and symptoms consistent with L hip labral repair, resulting in limited participation in pain-free ADLs and inability to perform at their prior level of function. Pt would benefit from physical therapy to address the impairments found & listed previously in the objective section in order to return to safe and pain-free ADLs and prior level of function.Improved ability to lay flat and tolerate hip extension, Progressing well, responded well to prone quad stretching. Continues to remain motivated, improved transfer and exercise tolerance     Complexity:Low  Prognosis: good  Response to care: good  Clinical Presentation: Stable and/or uncomplicated characteristics  Personal Factors: None    Problem List:  Pain  function  Mobility  Strength  Plan:     Planned Interventions include: therapeutic exercise, self-care home management, manual therapy, therapeutic activities, gait training, neuromuscular coordination, vasopneumatic, dry needling, aquatic therapy    Next Treatment:bike, stm, bridges, hip add, clamshell isometric  Frequency: 2 x Week  Duration: 12 Weeks  Goals: Set and discussed today  6 weeks    1. Pt will be independent with HEP.  2. Pt will demonstrated full pain free flexion hip ROM.  3.  Pt will demonstrated gross 4/5 L LE strength.  4.  Pt will report at least 85% on HOS ADL scale.  5.  Pt will demo DL and SL squats without deviations, demonstrating good body mechanics and  alignment.    12 weeks  6.  Pt will report no pain with cooking, cleaning, sleep, ADL's, and driving.  7.  Pt will ambulate for at least 45' without c/o pain.  8. Pt will participate in a return to plyometrics/joint loading program to allow safe return to run.  9.  Pt will demonstrated gross 4+/5 L LE strength.    Plan of care was developed with input and agreement by the patient      Omero Greene, PT

## 2025-06-12 ENCOUNTER — TREATMENT (OUTPATIENT)
Dept: PHYSICAL THERAPY | Facility: CLINIC | Age: 40
End: 2025-06-12
Payer: COMMERCIAL

## 2025-06-12 DIAGNOSIS — S76.012D STRAIN OF HIP AND THIGH, LEFT, SUBSEQUENT ENCOUNTER: ICD-10-CM

## 2025-06-12 DIAGNOSIS — S76.912D STRAIN OF HIP AND THIGH, LEFT, SUBSEQUENT ENCOUNTER: ICD-10-CM

## 2025-06-12 DIAGNOSIS — S73.192D TEAR OF LEFT ACETABULAR LABRUM, SUBSEQUENT ENCOUNTER: ICD-10-CM

## 2025-06-12 PROCEDURE — 97110 THERAPEUTIC EXERCISES: CPT | Mod: GP | Performed by: PHYSICAL THERAPIST

## 2025-06-12 PROCEDURE — 97140 MANUAL THERAPY 1/> REGIONS: CPT | Mod: GP | Performed by: PHYSICAL THERAPIST

## 2025-06-12 NOTE — PROGRESS NOTES
Physical Therapy  Physical Therapy Orthopedic Evaluation    Patient Name: Kitty Dunbar  MRN: [id number]  Today's Date: 6/12/2025  Time Calculation  Start Time: 1245  Stop Time: 1331  Time Calculation (min): 46 min       PT Therapeutic Procedures Time Entry  Therapeutic Exercise Time Entry: 21  Manual Therapy Time Entry: 25       Insurance:  Visit number: 4  Authorization APPROVED 30 VISITS (USED 2 VISITS), 1/23/25-8/29/25  Insurance Type: Payor:  EMPLOYEE MEDICAL PLAN / Plan:  EMPLOYEE MEDICAL PLAN TRADITIONAL  / Product Type: *No Product type* /      Current Problem     Problem List Items Addressed This Visit           ICD-10-CM    Strain of left hip S76.012A    Tear of left acetabular labrum S73.192A       Surgery:David Olmedo MD 5/22/25 Left Hip Arthroscopy; Labral Repair; Rim Trim; Osteoplasty; Capsular Plication       Referred by: David Olmedo MD      Precautions:       Subjective:   Subjective reports she is feeling good, feels like she is making progress, however does notice at times without movement she looses some circulation in her feet      General:  Chief Complaint: L hip surgery  Onset: original injury December 10, 2024  MARGARITO: she was stepping down from a stepstool, stepping into a raised dog bowel with her left foot, twisted on the leg and fell backwards and landed on her backside   Pain:   L hip 7/10    Relevant Information (PMH & Previous Tests/Imaging): + imaging prior to surgery    Previous Interventions/Treatments: Physical Therapy and Injections    Prior Level of Function (PLOF)  Patient previously independent with all ADLs  Exercise/Physical Activity: Active with children  Work/School: Respiratory therapist    Patients Living Environment: Reviewed and no concern  Primary Language: English  Patient's Goal(s) for Therapy: reduce pain and improve pain free     Red Flags: Do you have any of the following? No  Fever/chills, unexplained weight changes, dizziness/fainting, unexplained  "change in bowel or bladder functions, unexplained malaise or muscle weakness, night pain/sweats, numbness or tingling    Objective:  Objective     Palpation/Observation  Presents with T scope and alicia axillary crutches   Posture  Neutral LE alignment supine  Special Testing    ROM  6/12/2025  Presents to therapy flexed posture 30 degree hip flexion.  Unable initially to lay flat secondary to pain symptoms and hip flexor spasming  Strength  6/12/2025  3/5 knee , ankle, formal testing not performed secondary to surgery  Sensation  Limited sensation anterior pain  Reflexes      Transfers: mod UE assist, external support for L LE movement   Gait: non reciprocal flat foot pattern with crutches      Treatments:  Ther-EX:  Bridges with ball x 15  Prone quad stretch 10 x 10\", lower time secondary to pain  Bike x 7  SLR extension prone x 12  Quadraped rockingx 15  Bent knee fallout x 15    Gait mechanics with patient working into hip extension with stance phase on L LE  Manual:  Soft tissue techniques hip flexor quad glutes and adductor area.  Modalities:  Discussed ice       PT Therapeutic Procedures Time Entry  Therapeutic Exercise Time Entry: 21  Manual Therapy Time Entry: 25       EDUCATION: Home exercise program, plan of care, activity modifications, pain management, and injury pathology     Code: [email or web address]    Medical History Form: Reviewed (scanned into chart)  Reviewed medical form, [place], Falls risk, Holiness beliefs, PHQ     Screening  Frequency  Date Last Completed   Spiritual and Cultural Beliefs   Screening each visit or episode of care 5/22/2025   Falls Risk Screening every ambulatory visit    Pain Screening annually at primary care visit  5/13/2025   Domestic Violence screening annually at primary care visit 5/13/2025   Depression Screening annually in the primary care setting 5/13/2025   Suicide Risk Screening annually in the primary care setting 5/22/2025   Nutrition and Food Insecurity   Screening at " least annually at primary care visit     Key Learner annually in the primary care setting 5/22/2025   Drug Screen  5/22/2025  8:59 AM   Alcohol Screen  5/22/2025  8:59 AM   Advance Directive  5/13/2025     Time Calculation  Start Time: 1245  Stop Time: 1331  Time Calculation (min): 46 min    PT Therapeutic Procedures Time Entry  Therapeutic Exercise Time Entry: 21  Manual Therapy Time Entry: 25          Assessment: Patient presents with signs and symptoms consistent with L hip labral repair, resulting in limited participation in pain-free ADLs and inability to perform at their prior level of function. Pt would benefit from physical therapy to address the impairments found & listed previously in the objective section in order to return to safe and pain-free ADLs and prior level of function.Improved ability to tolerate hip extension, improved ROM, she is still guarded with increased muscle spasms  Complexity:Low  Prognosis: good  Response to care: good  Clinical Presentation: Stable and/or uncomplicated characteristics  Personal Factors: None    Problem List:  Pain  function  Mobility  Strength  Plan:     Planned Interventions include: therapeutic exercise, self-care home management, manual therapy, therapeutic activities, gait training, neuromuscular coordination, vasopneumatic, dry needling, aquatic therapy    Next Treatment:bike, stm, bridges, hip add, clamshell isometric  Frequency: 2 x Week  Duration: 12 Weeks  Goals: Set and discussed today  6 weeks    1. Pt will be independent with HEP.  2. Pt will demonstrated full pain free flexion hip ROM.  3.  Pt will demonstrated gross 4/5 L LE strength.  4.  Pt will report at least 85% on HOS ADL scale.  5.  Pt will demo DL and SL squats without deviations, demonstrating good body mechanics and alignment.    12 weeks  6.  Pt will report no pain with cooking, cleaning, sleep, ADL's, and driving.  7.  Pt will ambulate for at least 45' without c/o pain.  8. Pt will  participate in a return to plyometrics/joint loading program to allow safe return to run.  9.  Pt will demonstrated gross 4+/5 L LE strength.    Plan of care was developed with input and agreement by the patient      Omero Greene, PT

## 2025-06-17 ENCOUNTER — TREATMENT (OUTPATIENT)
Dept: PHYSICAL THERAPY | Facility: CLINIC | Age: 40
End: 2025-06-17
Payer: COMMERCIAL

## 2025-06-17 DIAGNOSIS — S76.012D STRAIN OF HIP AND THIGH, LEFT, SUBSEQUENT ENCOUNTER: ICD-10-CM

## 2025-06-17 DIAGNOSIS — S76.912D STRAIN OF HIP AND THIGH, LEFT, SUBSEQUENT ENCOUNTER: ICD-10-CM

## 2025-06-17 DIAGNOSIS — S73.192D TEAR OF LEFT ACETABULAR LABRUM, SUBSEQUENT ENCOUNTER: ICD-10-CM

## 2025-06-17 PROCEDURE — 97110 THERAPEUTIC EXERCISES: CPT | Mod: GP | Performed by: PHYSICAL THERAPIST

## 2025-06-17 PROCEDURE — 97140 MANUAL THERAPY 1/> REGIONS: CPT | Mod: GP | Performed by: PHYSICAL THERAPIST

## 2025-06-17 NOTE — PROGRESS NOTES
Physical Therapy  Physical Therapy Orthopedic Evaluation    Patient Name: Kitty Dunbar  MRN: [id number]  Today's Date: 6/17/2025  Time Calculation  Start Time: 1215  Stop Time: 1310  Time Calculation (min): 55 min       PT Therapeutic Procedures Time Entry  Therapeutic Exercise Time Entry: 25  Manual Therapy Time Entry: 20       Insurance:  Visit number: 5  Authorization APPROVED 30 VISITS (USED 2 VISITS), 1/23/25-8/29/25  Insurance Type: Payor:  EMPLOYEE MEDICAL PLAN / Plan:  EMPLOYEE MEDICAL PLAN TRADITIONAL  / Product Type: *No Product type* /      Current Problem     Problem List Items Addressed This Visit           ICD-10-CM    Strain of left hip S76.012A    Tear of left acetabular labrum S73.192A       Surgery:David Olmedo MD 5/22/25 Left Hip Arthroscopy; Labral Repair; Rim Trim; Osteoplasty; Capsular Plication       Referred by: David Olmedo MD      Precautions:       Subjective:   Subjective reports she is feeling ok, lost her balance, rolled her ankle and fell this morning on to her hip, more soreness lateral aspect and posterior hip      General:  Chief Complaint: L hip surgery  Onset: original injury December 10, 2024  MARGARITO: she was stepping down from a stepstool, stepping into a raised dog bowel with her left foot, twisted on the leg and fell backwards and landed on her backside   Pain:   L hip5/10    Relevant Information (PMH & Previous Tests/Imaging): + imaging prior to surgery    Previous Interventions/Treatments: Physical Therapy and Injections    Prior Level of Function (PLOF)  Patient previously independent with all ADLs  Exercise/Physical Activity: Active with children  Work/School: Respiratory therapist    Patients Living Environment: Reviewed and no concern  Primary Language: English  Patient's Goal(s) for Therapy: reduce pain and improve pain free     Red Flags: Do you have any of the following? No  Fever/chills, unexplained weight changes, dizziness/fainting, unexplained  "change in bowel or bladder functions, unexplained malaise or muscle weakness, night pain/sweats, numbness or tingling    Objective:  Objective     Palpation/Observation  Presents with T scope and alicia axillary crutches   Posture  Neutral LE alignment supine  Special Testing    ROM  6/17/2025  Presents to therapy flexed posture 30 degree hip flexion.  Unable initially to lay flat secondary to pain symptoms and hip flexor spasming  Strength  6/17/2025  3/5 knee , ankle, formal testing not performed secondary to surgery  Sensation  Limited sensation anterior pain  Reflexes      Transfers: mod UE assist, external support for L LE movement   Gait: non reciprocal flat foot pattern with crutches      Treatments:  Ther-EX:  Swiss ball flexion x 15  Prone quad stretch 10 x 10\", lower time secondary to pain  Bike x 7  SLR extension prone x 12  Side clamshells x 15  Bent knee fallout x 15  Prone on elbows x 10 10\"    Gait mechanics with patient working into hip extension with stance phase on L LE  Manual:  Soft tissue techniques hip flexor quad glutes and adductor area., PROM L hip  Modalities:  Discussed ice       PT Therapeutic Procedures Time Entry  Therapeutic Exercise Time Entry: 25  Manual Therapy Time Entry: 20       EDUCATION: Home exercise program, plan of care, activity modifications, pain management, and injury pathology     Code: [email or web address]    Medical History Form: Reviewed (scanned into chart)  Reviewed medical form, [place], Falls risk, Taoist beliefs, PHQ     Screening  Frequency  Date Last Completed   Spiritual and Cultural Beliefs   Screening each visit or episode of care 5/22/2025   Falls Risk Screening every ambulatory visit    Pain Screening annually at primary care visit  5/13/2025   Domestic Violence screening annually at primary care visit 5/13/2025   Depression Screening annually in the primary care setting 5/13/2025   Suicide Risk Screening annually in the primary care setting 5/22/2025   Nutrition " and Food Insecurity   Screening at least annually at primary care visit     Key Learner annually in the primary care setting 5/22/2025   Drug Screen  5/22/2025  8:59 AM   Alcohol Screen  5/22/2025  8:59 AM   Advance Directive  5/13/2025     Time Calculation  Start Time: 1215  Stop Time: 1310  Time Calculation (min): 55 min    PT Therapeutic Procedures Time Entry  Therapeutic Exercise Time Entry: 25  Manual Therapy Time Entry: 20          Assessment: Patient presents with signs and symptoms consistent with L hip labral repair, resulting in limited participation in pain-free ADLs and inability to perform at their prior level of function. Pt would benefit from physical therapy to address the impairments found & listed previously in the objective section in order to return to safe and pain-free ADLs and prior level of function.Improved ability to tolerate hip extension, improved ROM, Less hip flexor spasms, more tightness through TFL and glute med, responding well to added interventions for ROM and light loading. She is trying to wean from brace, work on increased weightbearing with improve mechanics prior to discontinuation of crutches  Complexity:Low  Prognosis: good  Response to care: good  Clinical Presentation: Stable and/or uncomplicated characteristics  Personal Factors: None    Problem List:  Pain  function  Mobility  Strength  Plan:     Planned Interventions include: therapeutic exercise, self-care home management, manual therapy, therapeutic activities, gait training, neuromuscular coordination, vasopneumatic, dry needling, aquatic therapy    Next Treatment:quadruped to prone press, prone Hip rotations,  Frequency: 2 x Week  Duration: 12 Weeks  Goals: Set and discussed today  6 weeks    1. Pt will be independent with HEP.  2. Pt will demonstrated full pain free flexion hip ROM.  3.  Pt will demonstrated gross 4/5 L LE strength.  4.  Pt will report at least 85% on HOS ADL scale.  5.  Pt will demo DL and SL  squats without deviations, demonstrating good body mechanics and alignment.    12 weeks  6.  Pt will report no pain with cooking, cleaning, sleep, ADL's, and driving.  7.  Pt will ambulate for at least 45' without c/o pain.  8. Pt will participate in a return to plyometrics/joint loading program to allow safe return to run.  9.  Pt will demonstrated gross 4+/5 L LE strength.    Plan of care was developed with input and agreement by the patient      Omero Greene, PT

## 2025-06-19 ENCOUNTER — TREATMENT (OUTPATIENT)
Dept: PHYSICAL THERAPY | Facility: CLINIC | Age: 40
End: 2025-06-19
Payer: COMMERCIAL

## 2025-06-19 DIAGNOSIS — S76.012D STRAIN OF HIP AND THIGH, LEFT, SUBSEQUENT ENCOUNTER: ICD-10-CM

## 2025-06-19 DIAGNOSIS — S76.912D STRAIN OF HIP AND THIGH, LEFT, SUBSEQUENT ENCOUNTER: ICD-10-CM

## 2025-06-19 DIAGNOSIS — S73.192D TEAR OF LEFT ACETABULAR LABRUM, SUBSEQUENT ENCOUNTER: ICD-10-CM

## 2025-06-19 PROCEDURE — 97110 THERAPEUTIC EXERCISES: CPT | Mod: GP | Performed by: PHYSICAL THERAPIST

## 2025-06-19 PROCEDURE — 97140 MANUAL THERAPY 1/> REGIONS: CPT | Mod: GP | Performed by: PHYSICAL THERAPIST

## 2025-06-19 NOTE — PROGRESS NOTES
Physical Therapy  Physical Therapy Orthopedic Evaluation    Patient Name: Kitty Dunbar  MRN: [id number]  Today's Date: 6/19/2025                  Insurance:  Visit number: 6  Authorization APPROVED 30 VISITS (USED 2 VISITS), 1/23/25-8/29/25  Insurance Type: Payor:  EMPLOYEE MEDICAL PLAN / Plan:  EMPLOYEE MEDICAL PLAN TRADITIONAL  / Product Type: *No Product type* /      Current Problem     Problem List Items Addressed This Visit           ICD-10-CM    Strain of left hip S76.012A       Surgery:David Olmedo MD 5/22/25 Left Hip Arthroscopy; Labral Repair; Rim Trim; Osteoplasty; Capsular Plication       Referred by: David Olmedo MD      Precautions:       Subjective:   Subjective reports she is feeling pretty good, transitioning away from brace. Had a hard time sleeping last night      General:  Chief Complaint: L hip surgery  Onset: original injury December 10, 2024  MARGARITO: she was stepping down from a stepstool, stepping into a raised dog bowel with her left foot, twisted on the leg and fell backwards and landed on her backside   Pain:   L hip5/10    Relevant Information (PMH & Previous Tests/Imaging): + imaging prior to surgery    Previous Interventions/Treatments: Physical Therapy and Injections    Prior Level of Function (PLOF)  Patient previously independent with all ADLs  Exercise/Physical Activity: Active with children  Work/School: Respiratory therapist    Patients Living Environment: Reviewed and no concern  Primary Language: English  Patient's Goal(s) for Therapy: reduce pain and improve pain free     Red Flags: Do you have any of the following? No  Fever/chills, unexplained weight changes, dizziness/fainting, unexplained change in bowel or bladder functions, unexplained malaise or muscle weakness, night pain/sweats, numbness or tingling    Objective:  Objective     Palpation/Observation  Presents with T scope and alicia axillary crutches   Posture  Neutral LE alignment supine  Special  "Testing    ROM  6/19/2025  Presents to therapy flexed posture 30 degree hip flexion.  Unable initially to lay flat secondary to pain symptoms and hip flexor spasming  Strength  6/19/2025  3/5 knee , ankle, formal testing not performed secondary to surgery  Sensation  Limited sensation anterior pain  Reflexes      Transfers: mod UE assist, external support for L LE movement   Gait: non reciprocal flat foot pattern with crutches      Treatments:  Ther-EX:  Swiss ball flexion x 15  Bike x 7  SLR extension prone x 12  Hip abd x 15  Bent knee fallout x 15  Prone on elbows x 10 10\"    Gait mechanics with patient working into hip extension with stance phase on L LE  Manual:  Soft tissue techniques hip flexor quad glutes and adductor area., PROM L hip  Modalities:  Discussed ice               EDUCATION: Home exercise program, plan of care, activity modifications, pain management, and injury pathology     Code: [email or web address]    Medical History Form: Reviewed (scanned into chart)  Reviewed medical form, [place], Falls risk, Jain beliefs, PHQ     Screening  Frequency  Date Last Completed   Spiritual and Cultural Beliefs   Screening each visit or episode of care 5/22/2025   Falls Risk Screening every ambulatory visit    Pain Screening annually at primary care visit  5/13/2025   Domestic Violence screening annually at primary care visit 5/13/2025   Depression Screening annually in the primary care setting 5/13/2025   Suicide Risk Screening annually in the primary care setting 5/22/2025   Nutrition and Food Insecurity   Screening at least annually at primary care visit     Key Learner annually in the primary care setting 5/22/2025   Drug Screen  5/22/2025  8:59 AM   Alcohol Screen  5/22/2025  8:59 AM   Advance Directive  5/13/2025                     Assessment: Patient presents with signs and symptoms consistent with L hip labral repair, resulting in limited participation in pain-free ADLs and inability to perform at their " prior level of function. Pt would benefit from physical therapy to address the impairments found & listed previously in the objective section in order to return to safe and pain-free ADLs and prior level of function.Improved weightbearing tolerance, discontinued brace with setback, less glute med and hip flexor tightness, her leg is able to lay flat at the beginning of today's treatment  Complexity:Low  Prognosis: good  Response to care: good  Clinical Presentation: Stable and/or uncomplicated characteristics  Personal Factors: None    Problem List:  Pain  function  Mobility  Strength  Plan:     Planned Interventions include: therapeutic exercise, self-care home management, manual therapy, therapeutic activities, gait training, neuromuscular coordination, vasopneumatic, dry needling, aquatic therapy    Next Treatment:quadruped to prone press, prone Hip rotations,  Frequency: 2 x Week  Duration: 12 Weeks  Goals: Set and discussed today  6 weeks    1. Pt will be independent with HEP.  2. Pt will demonstrated full pain free flexion hip ROM.  3.  Pt will demonstrated gross 4/5 L LE strength.  4.  Pt will report at least 85% on HOS ADL scale.  5.  Pt will demo DL and SL squats without deviations, demonstrating good body mechanics and alignment.    12 weeks  6.  Pt will report no pain with cooking, cleaning, sleep, ADL's, and driving.  7.  Pt will ambulate for at least 45' without c/o pain.  8. Pt will participate in a return to plyometrics/joint loading program to allow safe return to run.  9.  Pt will demonstrated gross 4+/5 L LE strength.    Plan of care was developed with input and agreement by the patient      Omero Greene, PT

## 2025-06-24 ENCOUNTER — APPOINTMENT (OUTPATIENT)
Dept: PHYSICAL THERAPY | Facility: CLINIC | Age: 40
End: 2025-06-24
Payer: COMMERCIAL

## 2025-06-24 DIAGNOSIS — S76.012D STRAIN OF HIP AND THIGH, LEFT, SUBSEQUENT ENCOUNTER: ICD-10-CM

## 2025-06-24 DIAGNOSIS — S76.912D STRAIN OF HIP AND THIGH, LEFT, SUBSEQUENT ENCOUNTER: ICD-10-CM

## 2025-06-26 ENCOUNTER — TREATMENT (OUTPATIENT)
Dept: PHYSICAL THERAPY | Facility: CLINIC | Age: 40
End: 2025-06-26
Payer: COMMERCIAL

## 2025-06-26 DIAGNOSIS — S73.192D TEAR OF LEFT ACETABULAR LABRUM, SUBSEQUENT ENCOUNTER: ICD-10-CM

## 2025-06-26 DIAGNOSIS — S76.912D STRAIN OF HIP AND THIGH, LEFT, SUBSEQUENT ENCOUNTER: ICD-10-CM

## 2025-06-26 DIAGNOSIS — S76.012D STRAIN OF HIP AND THIGH, LEFT, SUBSEQUENT ENCOUNTER: ICD-10-CM

## 2025-06-26 PROCEDURE — 97140 MANUAL THERAPY 1/> REGIONS: CPT | Mod: GP | Performed by: PHYSICAL THERAPIST

## 2025-06-26 PROCEDURE — 97110 THERAPEUTIC EXERCISES: CPT | Mod: GP | Performed by: PHYSICAL THERAPIST

## 2025-06-26 NOTE — PROGRESS NOTES
Physical Therapy  Physical Therapy Orthopedic Evaluation    Patient Name: Kitty Dunbar  MRN: [id number]  Today's Date: 6/26/2025  Time Calculation  Start Time: 1145  Stop Time: 1230  Time Calculation (min): 45 min       PT Therapeutic Procedures Time Entry  Therapeutic Exercise Time Entry: 15  Manual Therapy Time Entry: 30       Insurance:  Visit number: 8  Authorization APPROVED 30 VISITS (USED 2 VISITS), 1/23/25-8/29/25  Insurance Type: Payor:  EMPLOYEE MEDICAL PLAN / Plan:  EMPLOYEE MEDICAL PLAN TRADITIONAL  / Product Type: *No Product type* /      Current Problem     Problem List Items Addressed This Visit           ICD-10-CM    Strain of left hip S76.012A    Tear of left acetabular labrum S73.192A       Surgery:David Olmedo MD 5/22/25 Left Hip Arthroscopy; Labral Repair; Rim Trim; Osteoplasty; Capsular Plication       Referred by: David Olmedo MD      Precautions:       Subjective:   Subjective reports she is feeling rough, her back and L leg are bothering her a lot, she is getting a lot of numbness and tingling, cannot find a comfortable spot to sleep in, only getting a few hours of sleep at once. Feels at times that her leg gets very cold like she is losing circulation      General:  Chief Complaint: L hip surgery  Onset: original injury December 10, 2024  MARGARITO: she was stepping down from a stepstool, stepping into a raised dog bowel with her left foot, twisted on the leg and fell backwards and landed on her backside   Pain:   L hip5/10    Relevant Information (PMH & Previous Tests/Imaging): + imaging prior to surgery    Previous Interventions/Treatments: Physical Therapy and Injections    Prior Level of Function (PLOF)  Patient previously independent with all ADLs  Exercise/Physical Activity: Active with children  Work/School: Respiratory therapist    Patients Living Environment: Reviewed and no concern  Primary Language: English  Patient's Goal(s) for Therapy: reduce pain and improve pain  "free     Red Flags: Do you have any of the following? No  Fever/chills, unexplained weight changes, dizziness/fainting, unexplained change in bowel or bladder functions, unexplained malaise or muscle weakness, night pain/sweats, numbness or tingling    Objective:  Objective     Palpation/Observation  Presents with T scope and alicia axillary crutches   Posture  Neutral LE alignment supine  Special Testing    ROM  6/26/2025  Presents to therapy flexed posture 30 degree hip flexion.  Unable initially to lay flat secondary to pain symptoms and hip flexor spasming  Strength  6/26/2025  3/5 knee , ankle, formal testing not performed secondary to surgery  Sensation  Limited sensation anterior pain  Reflexes      Transfers: mod UE assist, external support for L LE movement   Gait: non reciprocal flat foot pattern with crutches      Treatments:  Ther-EX:  Swiss ball flexion x 15  Bike x 7  Light shuttle press 31 lbs DL 3 x 10  Bridge with clamshell RTB x 15 3\"      Gait mechanics with patient working on stride length and form, decrease utilization of crutches PRN, double to single assist  Manual:  Soft tissue techniques hip flexor quad glutes and adductor area., PROM L hip  Proximal distraction/scoops and lateral distraction as tolerated for flexion   Scar massage, discussed home massage   Modalities:  Discussed ice       PT Therapeutic Procedures Time Entry  Therapeutic Exercise Time Entry: 15  Manual Therapy Time Entry: 30       EDUCATION: Home exercise program, plan of care, activity modifications, pain management, and injury pathology     Code: [email or web address]    Medical History Form: Reviewed (scanned into chart)  Reviewed medical form, [place], Falls risk, Taoist beliefs, PHQ     Screening  Frequency  Date Last Completed   Spiritual and Cultural Beliefs   Screening each visit or episode of care 5/22/2025   Falls Risk Screening every ambulatory visit    Pain Screening annually at primary care visit  5/13/2025   Domestic " Violence screening annually at primary care visit 5/13/2025   Depression Screening annually in the primary care setting 5/13/2025   Suicide Risk Screening annually in the primary care setting 5/22/2025   Nutrition and Food Insecurity   Screening at least annually at primary care visit     Key Learner annually in the primary care setting 5/22/2025   Drug Screen  5/22/2025  8:59 AM   Alcohol Screen  5/22/2025  8:59 AM   Advance Directive  5/13/2025     Time Calculation  Start Time: 1145  Stop Time: 1230  Time Calculation (min): 45 min    PT Therapeutic Procedures Time Entry  Therapeutic Exercise Time Entry: 15  Manual Therapy Time Entry: 30          Assessment: Patient presents with signs and symptoms consistent with L hip labral repair, resulting in limited participation in pain-free ADLs and inability to perform at their prior level of function.Patient continues to struggle secondary to lumbar radic and continued L hip pain, she hasn't been as active lately because of her radicular pain, she still has weakness secondary to limitations currently and pre op limitations greater than 6 months  Complexity:Low  Prognosis: good  Response to care: good  Clinical Presentation: Stable and/or uncomplicated characteristics  Personal Factors: None    Problem List:  Pain  function  Mobility  Strength  Plan:     Planned Interventions include: therapeutic exercise, self-care home management, manual therapy, therapeutic activities, gait training, neuromuscular coordination, vasopneumatic, dry needling, aquatic therapy    Next Treatment:Manual STM, hip motion, mobilizations, gait, light shuttle press, hip hikes  Frequency: 2 x Week  Duration: 12 Weeks  Goals: Set and discussed today  6 weeks    1. Pt will be independent with HEP.  2. Pt will demonstrated full pain free flexion hip ROM.  3.  Pt will demonstrated gross 4/5 L LE strength.  4.  Pt will report at least 85% on HOS ADL scale.  5.  Pt will demo DL and SL squats without  deviations, demonstrating good body mechanics and alignment.    12 weeks  6.  Pt will report no pain with cooking, cleaning, sleep, ADL's, and driving.  7.  Pt will ambulate for at least 45' without c/o pain.  8. Pt will participate in a return to plyometrics/joint loading program to allow safe return to run.  9.  Pt will demonstrated gross 4+/5 L LE strength.    Plan of care was developed with input and agreement by the patient      Omero Greene, PT

## 2025-07-03 PROCEDURE — RXMED WILLOW AMBULATORY MEDICATION CHARGE

## 2025-07-09 ENCOUNTER — APPOINTMENT (OUTPATIENT)
Dept: PHYSICAL THERAPY | Facility: CLINIC | Age: 40
End: 2025-07-09
Payer: COMMERCIAL

## 2025-07-09 ENCOUNTER — OFFICE VISIT (OUTPATIENT)
Facility: CLINIC | Age: 40
End: 2025-07-09
Payer: COMMERCIAL

## 2025-07-09 VITALS
HEIGHT: 64 IN | RESPIRATION RATE: 16 BRPM | WEIGHT: 165 LBS | BODY MASS INDEX: 28.17 KG/M2 | SYSTOLIC BLOOD PRESSURE: 102 MMHG | DIASTOLIC BLOOD PRESSURE: 68 MMHG

## 2025-07-09 DIAGNOSIS — M51.369 ANNULAR TEAR OF LUMBAR DISC: ICD-10-CM

## 2025-07-09 DIAGNOSIS — S76.012D STRAIN OF LEFT HIP, SUBSEQUENT ENCOUNTER: Primary | ICD-10-CM

## 2025-07-09 DIAGNOSIS — S73.192D TEAR OF LEFT ACETABULAR LABRUM, SUBSEQUENT ENCOUNTER: ICD-10-CM

## 2025-07-09 DIAGNOSIS — M54.16 LUMBAR RADICULITIS: ICD-10-CM

## 2025-07-09 DIAGNOSIS — M54.16 LUMBAR RADICULOPATHY: ICD-10-CM

## 2025-07-09 DIAGNOSIS — M51.26 PROTRUDED LUMBAR DISC: ICD-10-CM

## 2025-07-09 DIAGNOSIS — G90.522 COMPLEX REGIONAL PAIN SYNDROME TYPE 1 OF LEFT LOWER EXTREMITY: Primary | ICD-10-CM

## 2025-07-09 DIAGNOSIS — S73.192D TEAR OF ACETABULAR LABRUM, LEFT, SUBSEQUENT ENCOUNTER: ICD-10-CM

## 2025-07-09 DIAGNOSIS — G89.29 OTHER CHRONIC PAIN: ICD-10-CM

## 2025-07-09 PROCEDURE — 99214 OFFICE O/P EST MOD 30 MIN: CPT | Performed by: NURSE PRACTITIONER

## 2025-07-09 PROCEDURE — 1036F TOBACCO NON-USER: CPT | Performed by: NURSE PRACTITIONER

## 2025-07-09 PROCEDURE — RXMED WILLOW AMBULATORY MEDICATION CHARGE

## 2025-07-09 PROCEDURE — 3008F BODY MASS INDEX DOCD: CPT | Performed by: NURSE PRACTITIONER

## 2025-07-09 RX ORDER — GABAPENTIN 300 MG/1
CAPSULE ORAL
Qty: 120 CAPSULE | Refills: 1 | Status: SHIPPED | OUTPATIENT
Start: 2025-07-09

## 2025-07-09 RX ORDER — ASCORBIC ACID 500 MG
500 TABLET ORAL DAILY
Qty: 30 TABLET | Refills: 1 | Status: SHIPPED | OUTPATIENT
Start: 2025-07-09 | End: 2026-07-09

## 2025-07-09 RX ORDER — METHOCARBAMOL 750 MG/1
750 TABLET, FILM COATED ORAL 3 TIMES DAILY
Qty: 30 TABLET | Refills: 0 | Status: SHIPPED | OUTPATIENT
Start: 2025-07-09 | End: 2025-07-10 | Stop reason: ALTCHOICE

## 2025-07-09 RX ORDER — GABAPENTIN 300 MG/1
300 CAPSULE ORAL 2 TIMES DAILY
Qty: 60 CAPSULE | Refills: 1 | Status: SHIPPED | OUTPATIENT
Start: 2025-07-09 | End: 2025-07-09

## 2025-07-09 ASSESSMENT — ENCOUNTER SYMPTOMS
ABDOMINAL DISTENTION: 0
DYSURIA: 0
VOMITING: 0
CONSTITUTIONAL NEGATIVE: 1
AGITATION: 0
BACK PAIN: 1
NEUROLOGICAL NEGATIVE: 1
NERVOUS/ANXIOUS: 0
DIARRHEA: 0
CONSTIPATION: 0
DIFFICULTY URINATING: 0
ABDOMINAL PAIN: 0
CONFUSION: 0
NAUSEA: 0
SLEEP DISTURBANCE: 0

## 2025-07-09 ASSESSMENT — PATIENT HEALTH QUESTIONNAIRE - PHQ9
2. FEELING DOWN, DEPRESSED OR HOPELESS: NOT AT ALL
SUM OF ALL RESPONSES TO PHQ9 QUESTIONS 1 AND 2: 0
1. LITTLE INTEREST OR PLEASURE IN DOING THINGS: NOT AT ALL

## 2025-07-09 ASSESSMENT — PAIN DESCRIPTION - DESCRIPTORS: DESCRIPTORS: NUMBNESS;TINGLING

## 2025-07-09 ASSESSMENT — PAIN SCALES - GENERAL
PAINLEVEL_OUTOF10: 7
PAINLEVEL_OUTOF10: 7

## 2025-07-09 ASSESSMENT — PAIN - FUNCTIONAL ASSESSMENT: PAIN_FUNCTIONAL_ASSESSMENT: 0-10

## 2025-07-09 NOTE — PROGRESS NOTES
Subjective   Patient ID: Kitty Dunbar is a 40 y.o. female who presents for Back Pain.  HPI  Patient is here for a follow up for her lumbar pain. She states she had a recent labral tear repair and is still recovering however since her low back and BL legs have been very painful. She is having trouble progressing in PT due to pain.   Her lumbar pain is across the low back however also radiates into her left hip and gluteus.   She is also having increased left foot and ankle swelling. This is accompanied with tingling and pain. She has color changes as well.     Review of Systems   Constitutional: Negative.    HENT: Negative.     Gastrointestinal:  Negative for abdominal distention, abdominal pain, constipation, diarrhea, nausea and vomiting.   Endocrine: Negative for cold intolerance and heat intolerance.   Genitourinary:  Negative for difficulty urinating, dysuria and urgency.   Musculoskeletal:  Positive for back pain.   Skin: Negative.    Neurological: Negative.    Psychiatric/Behavioral:  Negative for agitation, confusion, sleep disturbance and suicidal ideas. The patient is not nervous/anxious.        Objective   Physical Exam  Constitutional:       Appearance: Normal appearance.   Musculoskeletal:      Right lower leg: Swelling and tenderness present.      Left lower leg: Swelling and tenderness present.   Skin:     Comments: Discoloration to left lower extremity bluish in tone   Neurological:      Mental Status: She is alert.      Sensory: Sensory deficit present.      Motor: Weakness present.   Psychiatric:         Mood and Affect: Mood normal.         Behavior: Behavior normal.         Assessment/Plan   Problem List Items Addressed This Visit    None  Visit Diagnoses         Codes      Complex regional pain syndrome type 1 of left lower extremity    -  Primary G90.522    Relevant Orders    Referral to Physical Therapy    Sympathetic Block    FL pain management      Lumbar radiculitis     M54.16     Relevant Orders    Epidural Steroid Injection    FL pain management      Protruded lumbar disc     M51.26    Relevant Orders    Epidural Steroid Injection    FL pain management      Annular tear of lumbar disc     M51.369    Relevant Medications    gabapentin (Neurontin) 300 mg capsule      Lumbar radiculopathy     M54.16    Relevant Medications    gabapentin (Neurontin) 300 mg capsule      Tear of acetabular labrum, left, subsequent encounter     S73.192D    Relevant Medications    methocarbamol (Robaxin) 750 mg tablet      Other chronic pain     G89.29          I nice discussion with the patient today our plan will be as follows.    Radiology: no new imaging needed at this time.     Physically:  She will continue with PT I will also give her a new script to incorporate CRPS exercises    Psychologically:  no concern.     Medication: PDMP reviewed. WE will plan on increasing the gabapentin. Vitamin c was also sent in.     Duration:  chronic on going.     Intervention:  Discussed with DR. Walls we will start with a left lumbar sympathetic block. She may also need one on the right side.   I will also order a L4/5 [place] for her lumbar and radicular pain.   She will also be given a script for PT. Follow up after injections.         Saira Snider, CHIDI-CNP 07/09/25 2:01 PM

## 2025-07-10 ENCOUNTER — PHARMACY VISIT (OUTPATIENT)
Dept: PHARMACY | Facility: CLINIC | Age: 40
End: 2025-07-10
Payer: COMMERCIAL

## 2025-07-10 ENCOUNTER — PATIENT MESSAGE (OUTPATIENT)
Facility: CLINIC | Age: 40
End: 2025-07-10
Payer: COMMERCIAL

## 2025-07-10 DIAGNOSIS — M62.838 MUSCLE SPASM: Primary | ICD-10-CM

## 2025-07-10 PROCEDURE — RXMED WILLOW AMBULATORY MEDICATION CHARGE

## 2025-07-10 RX ORDER — TIZANIDINE 4 MG/1
4 TABLET ORAL EVERY 8 HOURS PRN
Qty: 90 TABLET | Refills: 2 | Status: SHIPPED | OUTPATIENT
Start: 2025-07-10 | End: 2025-08-10

## 2025-07-16 ENCOUNTER — TREATMENT (OUTPATIENT)
Dept: PHYSICAL THERAPY | Facility: CLINIC | Age: 40
End: 2025-07-16
Payer: COMMERCIAL

## 2025-07-16 ENCOUNTER — OFFICE VISIT (OUTPATIENT)
Dept: ORTHOPEDIC SURGERY | Facility: HOSPITAL | Age: 40
End: 2025-07-16
Payer: COMMERCIAL

## 2025-07-16 DIAGNOSIS — M25.552 LEFT HIP PAIN: ICD-10-CM

## 2025-07-16 DIAGNOSIS — S73.192D TEAR OF ACETABULAR LABRUM, LEFT, SUBSEQUENT ENCOUNTER: Primary | ICD-10-CM

## 2025-07-16 DIAGNOSIS — S76.012D STRAIN OF HIP AND THIGH, LEFT, SUBSEQUENT ENCOUNTER: ICD-10-CM

## 2025-07-16 DIAGNOSIS — S76.912D STRAIN OF HIP AND THIGH, LEFT, SUBSEQUENT ENCOUNTER: ICD-10-CM

## 2025-07-16 DIAGNOSIS — S73.192D TEAR OF LEFT ACETABULAR LABRUM, SUBSEQUENT ENCOUNTER: ICD-10-CM

## 2025-07-16 DIAGNOSIS — S76.012D STRAIN OF LEFT HIP, SUBSEQUENT ENCOUNTER: ICD-10-CM

## 2025-07-16 DIAGNOSIS — M25.852 FEMOROACETABULAR IMPINGEMENT OF LEFT HIP: Primary | ICD-10-CM

## 2025-07-16 PROCEDURE — 99024 POSTOP FOLLOW-UP VISIT: CPT | Performed by: SPECIALIST/TECHNOLOGIST

## 2025-07-16 PROCEDURE — 97110 THERAPEUTIC EXERCISES: CPT | Mod: GP,CQ

## 2025-07-16 PROCEDURE — 97140 MANUAL THERAPY 1/> REGIONS: CPT | Mod: GP,CQ

## 2025-07-16 PROCEDURE — 99212 OFFICE O/P EST SF 10 MIN: CPT | Performed by: SPECIALIST/TECHNOLOGIST

## 2025-07-16 NOTE — PROGRESS NOTES
Physical Therapy  Physical Therapy Orthopedic Evaluation    Patient Name: Kitty Dunbar  MRN: [id number]  Today's Date: 7/16/2025  Time Calculation  Start Time: 0415  Stop Time: 0500  Time Calculation (min): 45 min       PT Therapeutic Procedures Time Entry  Therapeutic Exercise Time Entry: 20  Manual Therapy Time Entry: 20       Insurance:  Visit number: 9  Authorization APPROVED 30 VISITS (USED 2 VISITS), 1/23/25-8/29/25  Insurance Type: Payor:  EMPLOYEE MEDICAL PLAN / Plan:  EMPLOYEE MEDICAL PLAN TRADITIONAL  / Product Type: *No Product type* /      Current Problem     Problem List Items Addressed This Visit           ICD-10-CM    Strain of left hip S76.012A    Left hip pain M25.552    Tear of left acetabular labrum S73.192A    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  Went to see Jhon Antonio today and said that she is progressing and can use 1 crutch but nothing around the house if I don't need it. He is giving me another round of meloxicam.       General:  Chief Complaint: L hip surgery  Onset: original injury December 10, 2024  MARGARITO: she was stepping down from a stepstool, stepping into a raised dog bowel with her left foot, twisted on the leg and fell backwards and landed on her backside   Pain:   L hip 3-4 /10    Relevant Information (PMH & Previous Tests/Imaging): + imaging prior to surgery    Previous Interventions/Treatments: Physical Therapy and Injections    Prior Level of Function (PLOF)  Patient previously independent with all ADLs  Exercise/Physical Activity: Active with children  Work/School:  "Respiratory therapist    Patients Living Environment: Reviewed and no concern  Primary Language: English  Patient's Goal(s) for Therapy: reduce pain and improve pain free     Red Flags: Do you have any of the following? No  Fever/chills, unexplained weight changes, dizziness/fainting, unexplained change in bowel or bladder functions, unexplained malaise or muscle weakness, night pain/sweats, numbness or tingling    Objective:  Objective     Palpation/Observation  Presents with T scope and alicia axillary crutches   Posture  Neutral LE alignment supine  Special Testing    ROM  7/16/2025  Presents to therapy flexed posture 30 degree hip flexion.  Unable initially to lay flat secondary to pain symptoms and hip flexor spasming  Strength  7/16/2025  3/5 knee , ankle, formal testing not performed secondary to surgery  Sensation  Limited sensation anterior pain  Reflexes      Transfers: mod UE assist, external support for L LE movement   Gait: non reciprocal flat foot pattern with crutches      Treatments:  Ther-EX:    Bridge with clamshell gTB x 15 3\"  Prone quad stretch 3 x   Prone hip ext 2 x 10   Shuttle press 25#  2 x 10     Gait mechanics with patient working on stride length and form, decrease utilization of crutches PRN, double to single assist  Manual:  Soft tissue techniques hip flexor quad glutes and adductor area., PROM L hip  Proximal distraction/scoops and lateral distraction as tolerated for flexion   Scar massage, discussed home massage   Modalities:  Discussed ice       PT Therapeutic Procedures Time Entry  Therapeutic Exercise Time Entry: 20  Manual Therapy Time Entry: 20       EDUCATION: Home exercise program, plan of care, activity modifications, pain management, and injury pathology     Code: [email or web address]    Medical History Form: Reviewed (scanned into chart)  Reviewed medical form, [place], Falls risk, Evangelical beliefs, PHQ  Annually for patients age 55 and older and patients with life-threatening " illness and more frequently at the discretion of the provider when there has been a change in patient condition/clinical indication.   Screening  Date Last Completed   Advance Directives 7/9/2025     Annually during a primary care office visit and at the discretion of the provider when there has been a change in patient condition/clinical indication.  Depression Screening 7/9/2025   Suicide Risk Screening 5/22/2025     Annually during any provider office visit and at the discretion of the provider when there has been a change in patient condition/clinical indication.  Alcohol Screening 7/16/2025  1:38 PM   Substance Use Screening 7/16/2025  1:38 PM    Tobacco 7/16/2025  1:38 PM     Completed at the discretion of the provider during any provider office visit or when there has been a change in patient condition/clinical indication.  Domestic Violence 7/9/2025   Human Trafficking    Spiritual/Cultural  7/9/2025   Food Insecurity Screening Social Determinants of Health     Patient Education/Key Learner 7/9/2025   Pain Screening 7/9/2025     Completed per Adult Falls Prevention (Outpatient), CP-119  Falls Risk Screening            Time Calculation  Start Time: 0415  Stop Time: 0500  Time Calculation (min): 45 min    PT Therapeutic Procedures Time Entry  Therapeutic Exercise Time Entry: 20  Manual Therapy Time Entry: 20          Assessment: Patient presents with signs and symptoms consistent with L hip labral repair, resulting in limited participation in pain-free ADLs and inability to perform at their prior level of function. Patient's L leg was flared up after manual ROM today. Shuttle also flared up CRPS symptoms.     Complexity:Low  Prognosis: good  Response to care: good  Clinical Presentation: Stable and/or uncomplicated characteristics  Personal Factors: None    Problem List:  Pain  function  Mobility  Strength  Plan:     Planned Interventions include: therapeutic exercise, self-care home management, manual  therapy, therapeutic activities, gait training, neuromuscular coordination, vasopneumatic, dry needling, aquatic therapy    Next Treatment:Manual STM, hip motion, mobilizations, gait, light shuttle press, hip hikes  Frequency: 2 x Week  Duration: 12 Weeks  Goals: Set and discussed today  6 weeks    1. Pt will be independent with HEP.  2. Pt will demonstrated full pain free flexion hip ROM.  3.  Pt will demonstrated gross 4/5 L LE strength.  4.  Pt will report at least 85% on HOS ADL scale.  5.  Pt will demo DL and SL squats without deviations, demonstrating good body mechanics and alignment.    12 weeks  6.  Pt will report no pain with cooking, cleaning, sleep, ADL's, and driving.  7.  Pt will ambulate for at least 45' without c/o pain.  8. Pt will participate in a return to plyometrics/joint loading program to allow safe return to run.  9.  Pt will demonstrated gross 4+/5 L LE strength.    Plan of care was developed with input and agreement by the patient      Lisbeth Madera PTA

## 2025-07-17 NOTE — PROGRESS NOTES
The patient returns 8 weeks out from their Left hip arthroscopy on 5/22/2025.  Overall, she is a bit frustrated.  She has been experiencing some unrelated hip issues via numbness and tingling and discoloration of the bilateral lower extremities to the knees.  The left side is worse than her right.  She states that the discoloration, swelling and numbness and tingling starts at the feet and works all the way up to her knee.  She has recently been diagnosed with chronic regional pain syndrome via pain management.  Her pain management physician has initiated gabapentin to help with these symptoms.  She is scheduled to have some upcoming injections into her foot and lower back.  She continues to use 1 crutch when outside of the house.  Her hip feels better than last visit.  She will get an occasional pulling in the groin that is worse following an increase in her activities.  She continues with physical therapy and is having some difficulties with the entire lower extremity.    The patient and I discussed her clinical presentation 8 weeks status post left hip arthroscopy.  Overall, she is frustrated with the new onset of the symptoms and new condition and her hip is moving well.  Her range of motion is symmetric compared to her nonoperative right hip.  There is some slight tightness with internal rotation.  Her strength continues to improve.  She has some slight tenderness to palpation over the anterior hip and groin.  I feel at this point, it is okay to begin to progress through her strengthening phase of the postoperative rehab course.  We will have the therapist continue to progress her as she can tolerate.  We discussed using topical Voltaren gel 4 times daily over the anterior hip.  I encouraged her to continue icing 15 to 20 minutes 2-3 times per day.  I encouraged her to elevate her lower extremity is much as she can.  She can do her activities of daily living to her tolerance.  She may discontinue the crutch  when she feels comfortable to do so.  We will see her back in 6 weeks for clinical recheck.  She is in agreement the plan.  Questions have been answered.      Cesar Antonio PA-C

## 2025-07-18 ENCOUNTER — TREATMENT (OUTPATIENT)
Dept: PHYSICAL THERAPY | Facility: CLINIC | Age: 40
End: 2025-07-18
Payer: COMMERCIAL

## 2025-07-18 DIAGNOSIS — S73.192D TEAR OF LEFT ACETABULAR LABRUM, SUBSEQUENT ENCOUNTER: ICD-10-CM

## 2025-07-18 DIAGNOSIS — S76.012D STRAIN OF HIP AND THIGH, LEFT, SUBSEQUENT ENCOUNTER: ICD-10-CM

## 2025-07-18 DIAGNOSIS — S76.912D STRAIN OF HIP AND THIGH, LEFT, SUBSEQUENT ENCOUNTER: ICD-10-CM

## 2025-07-18 DIAGNOSIS — G90.522 COMPLEX REGIONAL PAIN SYNDROME OF LEFT LOWER EXTREMITY: Primary | ICD-10-CM

## 2025-07-18 PROCEDURE — 97112 NEUROMUSCULAR REEDUCATION: CPT | Mod: GP | Performed by: PHYSICAL THERAPIST

## 2025-07-18 PROCEDURE — 97110 THERAPEUTIC EXERCISES: CPT | Mod: GP | Performed by: PHYSICAL THERAPIST

## 2025-07-18 PROCEDURE — 97140 MANUAL THERAPY 1/> REGIONS: CPT | Mod: GP | Performed by: PHYSICAL THERAPIST

## 2025-07-18 NOTE — PROGRESS NOTES
Physical Therapy  Physical Therapy Orthopedic Evaluation    Patient Name: Kitty Dunbar  MRN: [id number]  Today's Date: 7/18/2025  Time Calculation  Start Time: 1007  Stop Time: 1100  Time Calculation (min): 53 min       PT Therapeutic Procedures Time Entry  Therapeutic Exercise Time Entry: 15  Neuromuscular Re-Education Time Entry: 13  Manual Therapy Time Entry: 25       Insurance:  Visit number: 10  Authorization APPROVED 30 VISITS (USED 2 VISITS), 1/23/25-8/29/25  Insurance Type: Payor:  EMPLOYEE MEDICAL PLAN / Plan:  EMPLOYEE MEDICAL PLAN TRADITIONAL  / Product Type: *No Product type* /      Current Problem     Problem List Items Addressed This Visit           ICD-10-CM    Tear of left acetabular labrum S73.192A    Strain of hip and thigh, left, subsequent encounter S76.012D, S76.912D    Complex regional pain syndrome of left lower extremity - Primary G90.522       Surgery:David Olmedo MD 5/22/25 Left Hip Arthroscopy; Labral Repair; Rim Trim; Osteoplasty; Capsular Plication       Referred by: David Olmedo MD      Precautions:       Subjective:   Subjective reports that she about the same, good days and bad with the hip. Swelling in L>R ankle, sensational changes, especially coldness in the ankle and lower calf      General:  Chief Complaint: L hip surgery  Onset: original injury December 10, 2024  MARGARITO: she was stepping down from a stepstool, stepping into a raised dog bowel with her left foot, twisted on the leg and fell backwards and landed on her backside   Pain:   L hip 3-4 /10, ankle and foot L>R sensitivity, pressure, and standing worsen    Relevant Information (PMH & Previous Tests/Imaging): + imaging prior to surgery    Previous Interventions/Treatments: Physical Therapy and Injections    Prior Level of Function (PLOF)  Patient previously independent with all ADLs  Exercise/Physical Activity: Active with children  Work/School: Respiratory therapist    Patients Living Environment: Reviewed  and no concern  Primary Language: English  Patient's Goal(s) for Therapy: reduce pain and improve pain free     Red Flags: Do you have any of the following? No  Fever/chills, unexplained weight changes, dizziness/fainting, unexplained change in bowel or bladder functions, unexplained malaise or muscle weakness, night pain/sweats, numbness or tingling    Objective:  Objective     Palpation/Observation  Presents with T scope and alicia axillary crutches   Posture  Neutral LE alignment supine  Special Testing    ROM  7/18/2025  100 deg hip flexion, 40 deg abd, neutral ADD, IR 35 deg, ER 40 deg  Strength  7/18/2025  Sensation  Limited sensation anterior pain  Reflexes      Transfers: mod UE assist, external support for L LE movement   Gait: non reciprocal flat foot pattern with crutches      Treatments:  Ther-EX:  Mirror therapy ankle/knee/hip R side, discussed and educated  Standing hip flexion off small step x 15, abduction x 15  Side stepping at counter top without resistance x 20  Discussed aquatic therapy  Updated HEP        Manual:  Soft tissue techniques hip flexor quad glutes and adductor area., PROM L hip  Proximal and lateral distraction/scoops for flexion   Scar massage, STM to L lower calf  Modalities:  Discussed ice       PT Therapeutic Procedures Time Entry  Therapeutic Exercise Time Entry: 15  Neuromuscular Re-Education Time Entry: 13  Manual Therapy Time Entry: 25       EDUCATION: Home exercise program, plan of care, activity modifications, pain management, and injury pathology     Code: [email or web address]    Medical History Form: Reviewed (scanned into chart)  Reviewed medical form, [place], Falls risk, Yazidism beliefs, PHQ  Annually for patients age 55 and older and patients with life-threatening illness and more frequently at the discretion of the provider when there has been a change in patient condition/clinical indication.   Screening  Date Last Completed   Advance Directives 7/9/2025     Annually during  a primary care office visit and at the discretion of the provider when there has been a change in patient condition/clinical indication.  Depression Screening 7/9/2025   Suicide Risk Screening 5/22/2025     Annually during any provider office visit and at the discretion of the provider when there has been a change in patient condition/clinical indication.  Alcohol Screening 7/16/2025  1:38 PM   Substance Use Screening 7/16/2025  1:38 PM    Tobacco 7/17/2025  8:36 AM     Completed at the discretion of the provider during any provider office visit or when there has been a change in patient condition/clinical indication.  Domestic Violence 7/9/2025   Human Trafficking    Spiritual/Cultural  7/9/2025   Food Insecurity Screening Social Determinants of Health     Patient Education/Key Learner 7/9/2025   Pain Screening 7/9/2025     Completed per Adult Falls Prevention (Outpatient), CP-119  Falls Risk Screening            Time Calculation  Start Time: 1007  Stop Time: 1100  Time Calculation (min): 53 min    PT Therapeutic Procedures Time Entry  Therapeutic Exercise Time Entry: 15  Neuromuscular Re-Education Time Entry: 13  Manual Therapy Time Entry: 25          Assessment: Patient presents with signs and symptoms consistent with L hip labral repair and now CRPS, resulting in limited participation in pain-free ADLs and inability to perform at their prior level of function. Utilization of treatment techniques for CRPS, mirror therapy, light hip strengthening. She is limited with loading secondary to CRPS.     Complexity:Low  Prognosis: good  Response to care: good  Clinical Presentation: Stable and/or uncomplicated characteristics  Personal Factors: None    Problem List:  Pain  function  Mobility  Strength  Plan:     Planned Interventions include: therapeutic exercise, self-care home management, manual therapy, therapeutic activities, gait training, neuromuscular coordination, vasopneumatic, dry needling, aquatic  therapy    Next Treatment:hip hikes, mirror therapy for desensitization, light hip loading  Frequency: 2 x Week  Duration: 12 Weeks  Goals: Set and discussed today  6 weeks    1. Pt will be independent with HEP.  2. Pt will demonstrated full pain free flexion hip ROM.  3.  Pt will demonstrated gross 4/5 L LE strength.  4.  Pt will report at least 85% on HOS ADL scale.  5.  Pt will demo DL and SL squats without deviations, demonstrating good body mechanics and alignment.    12 weeks  6.  Pt will report no pain with cooking, cleaning, sleep, ADL's, and driving.  7.  Pt will ambulate for at least 45' without c/o pain.  8. Pt will participate in a return to plyometrics/joint loading program to allow safe return to run.  9.  Pt will demonstrated gross 4+/5 L LE strength.    Plan of care was developed with input and agreement by the patient      Omero Greene, PT

## 2025-07-21 ENCOUNTER — APPOINTMENT (OUTPATIENT)
Dept: PHYSICAL THERAPY | Facility: CLINIC | Age: 40
End: 2025-07-21
Payer: COMMERCIAL

## 2025-07-22 PROCEDURE — RXMED WILLOW AMBULATORY MEDICATION CHARGE

## 2025-07-25 ENCOUNTER — PHARMACY VISIT (OUTPATIENT)
Dept: PHARMACY | Facility: CLINIC | Age: 40
End: 2025-07-25
Payer: COMMERCIAL

## 2025-07-25 ENCOUNTER — TREATMENT (OUTPATIENT)
Dept: PHYSICAL THERAPY | Facility: CLINIC | Age: 40
End: 2025-07-25
Payer: COMMERCIAL

## 2025-07-25 DIAGNOSIS — S76.012D STRAIN OF LEFT HIP, SUBSEQUENT ENCOUNTER: ICD-10-CM

## 2025-07-25 DIAGNOSIS — S76.912D STRAIN OF HIP AND THIGH, LEFT, SUBSEQUENT ENCOUNTER: ICD-10-CM

## 2025-07-25 DIAGNOSIS — M25.552 LEFT HIP PAIN: ICD-10-CM

## 2025-07-25 DIAGNOSIS — S76.012D STRAIN OF HIP AND THIGH, LEFT, SUBSEQUENT ENCOUNTER: ICD-10-CM

## 2025-07-25 DIAGNOSIS — G57.72 COMPLEX REGIONAL PAIN SYNDROME TYPE 2 OF LEFT LOWER EXTREMITY: Primary | ICD-10-CM

## 2025-07-25 DIAGNOSIS — M25.852 FEMOROACETABULAR IMPINGEMENT OF LEFT HIP: ICD-10-CM

## 2025-07-25 DIAGNOSIS — S73.192D TEAR OF LEFT ACETABULAR LABRUM, SUBSEQUENT ENCOUNTER: ICD-10-CM

## 2025-07-25 PROCEDURE — 97032 APPL MODALITY 1+ESTIM EA 15: CPT | Mod: GP | Performed by: PHYSICAL THERAPIST

## 2025-07-25 PROCEDURE — 97140 MANUAL THERAPY 1/> REGIONS: CPT | Mod: GP | Performed by: PHYSICAL THERAPIST

## 2025-07-25 PROCEDURE — 97110 THERAPEUTIC EXERCISES: CPT | Mod: GP | Performed by: PHYSICAL THERAPIST

## 2025-07-25 NOTE — PROGRESS NOTES
Physical Therapy  Physical Therapy Orthopedic Evaluation    Patient Name: Kitty Dunbar  MRN: [id number]  Today's Date: 7/25/2025  Time Calculation  Start Time: 1153  Stop Time: 1235  Time Calculation (min): 42 min       PT Therapeutic Procedures Time Entry  Therapeutic Exercise Time Entry: 13  Manual Therapy Time Entry: 15  PT Modalities Time Entry  E-Stim (Attended) Time Entry: 10    Insurance:  Visit number: 10  Authorization APPROVED 30 VISITS (USED 2 VISITS), 1/23/25-8/29/25  Insurance Type: Payor:  EMPLOYEE MEDICAL PLAN / Plan:  EMPLOYEE MEDICAL PLAN TRADITIONAL  / Product Type: *No Product type* /      Current Problem     Problem List Items Addressed This Visit           ICD-10-CM    Strain of left hip S76.012A    Left hip pain M25.552    Tear of left acetabular labrum S73.192A    Femoroacetabular impingement of left hip M25.852    Strain of hip and thigh, left, subsequent encounter S76.012D, S76.912D    Complex regional pain syndrome of left lower extremity - Primary G90.522         Surgery:David Olmedo MD 5/22/25 Left Hip Arthroscopy; Labral Repair; Rim Trim; Osteoplasty; Capsular Plication       Referred by: David Olmedo MD      Precautions:       Subjective:   Subjective reports that she was sore earlier in the week,  lots of nerve pain after graduation party, swelling in both feet and hyper sensitivity      General:  Chief Complaint: L hip surgery  Onset: original injury December 10, 2024  MARGARITO: she was stepping down from a stepstool, stepping into a raised dog bowel with her left foot, twisted on the leg and fell backwards and landed on her backside   Pain:   L hip 3-4 /10, ankle and foot L>R sensitivity, pressure, and standing worsen    Relevant Information (PMH & Previous Tests/Imaging): + imaging prior to surgery    Previous Interventions/Treatments: Physical Therapy and Injections    Prior Level of Function (PLOF)  Patient previously independent with all ADLs  Exercise/Physical  Activity: Active with children  Work/School: Respiratory therapist    Patients Living Environment: Reviewed and no concern  Primary Language: English  Patient's Goal(s) for Therapy: reduce pain and improve pain free     Red Flags: Do you have any of the following? No  Fever/chills, unexplained weight changes, dizziness/fainting, unexplained change in bowel or bladder functions, unexplained malaise or muscle weakness, night pain/sweats, numbness or tingling    Objective:  Objective     Palpation/Observation  Presents with T scope and alicia axillary crutches   Posture  Neutral LE alignment supine  Special Testing    ROM  7/25/2025  100 deg hip flexion, 40 deg abd, neutral ADD, IR 35 deg, ER 40 deg  Strength  7/25/2025  Sensation  Limited sensation anterior pain  Reflexes      Transfers: mod UE assist, external support for L LE movement   Gait: non reciprocal flat foot pattern with crutches      Treatments:  Ther-EX:  Graded motor imagery for shoe and sock application  Standing Hip hike UE support x 1 2 x 10  Discussion of mirror therapy         Manual:  Soft tissue techniques hip flexor quad glutes and adductor area., PROM L hip    Modalities:  Application of dry needling of thigh proximal to distal lateral aspect, use of Estim at 20hz 2.5 Intensity alicia. Patient provided verbal consent, no adverse reactions noted       PT Therapeutic Procedures Time Entry  Therapeutic Exercise Time Entry: 13  Manual Therapy Time Entry: 15  PT Modalities Time Entry  E-Stim (Attended) Time Entry: 10    EDUCATION: Home exercise program, plan of care, activity modifications, pain management, and injury pathology     Code: [id number]    Medical History Form: Reviewed (scanned into chart)  Reviewed medical form, Key [place], Falls risk, Adventism beliefs, PHQ  Annually for patients age 55 and older and patients with life-threatening illness and more frequently at the discretion of the provider when there has been a change in patient  condition/clinical indication.   Screening  Date Last Completed   Advance Directives 7/9/2025     Annually during a primary care office visit and at the discretion of the provider when there has been a change in patient condition/clinical indication.  Depression Screening 7/9/2025   Suicide Risk Screening 5/22/2025     Annually during any provider office visit and at the discretion of the provider when there has been a change in patient condition/clinical indication.  Alcohol Screening 7/16/2025  1:38 PM   Substance Use Screening 7/16/2025  1:38 PM    Tobacco 7/17/2025  8:36 AM     Completed at the discretion of the provider during any provider office visit or when there has been a change in patient condition/clinical indication.  Domestic Violence 7/9/2025   Human Trafficking    Spiritual/Cultural  7/9/2025   Food Insecurity Screening Social Determinants of Health     Patient Education/Key Learner 7/9/2025   Pain Screening 7/9/2025     Completed per Adult Falls Prevention (Outpatient), CP-119  Falls Risk Screening            Time Calculation  Start Time: 1153  Stop Time: 1235  Time Calculation (min): 42 min    PT Therapeutic Procedures Time Entry  Therapeutic Exercise Time Entry: 13  Manual Therapy Time Entry: 15 PT Modalities Time Entry  E-Stim (Attended) Time Entry: 10        Assessment: Patient presents with signs and symptoms consistent with L hip labral repair and now CRPS, resulting in limited participation in pain-free ADLs and inability to perform at their prior level of function. Utilization of treatment techniques for CRPS, mirror therapy and graded motor imagery for motor cortex retraining. High levels of irritability limiting treatment progress. Sensitivity is not worse after treatment, responded to light hip hiking for strength  Complexity:Low  Prognosis: good  Response to care: good  Clinical Presentation: Stable and/or uncomplicated characteristics  Personal Factors: None    Problem  List:  Pain  function  Mobility  Strength  Plan:     Planned Interventions include: therapeutic exercise, self-care home management, manual therapy, therapeutic activities, gait training, neuromuscular coordination, vasopneumatic, dry needling, aquatic therapy    Next Treatment:hip hikes, mirror therapy for desensitization, light hip loading  Frequency: 2 x Week  Duration: 12 Weeks  Goals: Set and discussed today  6 weeks    1. Pt will be independent with HEP.  2. Pt will demonstrated full pain free flexion hip ROM.  3.  Pt will demonstrated gross 4/5 L LE strength.  4.  Pt will report at least 85% on HOS ADL scale.  5.  Pt will demo DL and SL squats without deviations, demonstrating good body mechanics and alignment.    12 weeks  6.  Pt will report no pain with cooking, cleaning, sleep, ADL's, and driving.  7.  Pt will ambulate for at least 45' without c/o pain.  8. Pt will participate in a return to plyometrics/joint loading program to allow safe return to run.  9.  Pt will demonstrated gross 4+/5 L LE strength.    Plan of care was developed with input and agreement by the patient      Omero Greene, PT

## 2025-07-31 ENCOUNTER — TREATMENT (OUTPATIENT)
Dept: PHYSICAL THERAPY | Facility: CLINIC | Age: 40
End: 2025-07-31
Payer: COMMERCIAL

## 2025-07-31 DIAGNOSIS — S73.192D TEAR OF LEFT ACETABULAR LABRUM, SUBSEQUENT ENCOUNTER: ICD-10-CM

## 2025-07-31 DIAGNOSIS — S76.012D STRAIN OF HIP AND THIGH, LEFT, SUBSEQUENT ENCOUNTER: ICD-10-CM

## 2025-07-31 DIAGNOSIS — S76.912D STRAIN OF HIP AND THIGH, LEFT, SUBSEQUENT ENCOUNTER: ICD-10-CM

## 2025-07-31 PROCEDURE — 97140 MANUAL THERAPY 1/> REGIONS: CPT | Mod: GP | Performed by: PHYSICAL THERAPIST

## 2025-07-31 PROCEDURE — 97110 THERAPEUTIC EXERCISES: CPT | Mod: GP | Performed by: PHYSICAL THERAPIST

## 2025-07-31 NOTE — PROGRESS NOTES
Physical Therapy  Physical Therapy Orthopedic Evaluation    Patient Name: Kitty Dunbar  MRN: [id number]  Today's Date: 7/31/2025  Time Calculation  Start Time: 1207  Stop Time: 1250  Time Calculation (min): 43 min       PT Therapeutic Procedures Time Entry  Therapeutic Exercise Time Entry: 25  Manual Therapy Time Entry: 10  PT Modalities Time Entry  E-Stim (Attended) Time Entry: 10    Insurance:  Visit number: 12  Authorization APPROVED 30 VISITS (USED 2 VISITS), 1/23/25-8/29/25  Insurance Type: Payor:  EMPLOYEE MEDICAL PLAN / Plan:  EMPLOYEE MEDICAL PLAN TRADITIONAL  / Product Type: *No Product type* /      Current Problem     Problem List Items Addressed This Visit           ICD-10-CM    Tear of left acetabular labrum S73.192A    Strain of hip and thigh, left, subsequent encounter S76.012D, S76.912D           Surgery:David Olmedo MD 5/22/25 Left Hip Arthroscopy; Labral Repair; Rim Trim; Osteoplasty; Capsular Plication       Referred by: David Olmedo MD      Precautions:       Subjective:   Subjective reports that she is having a hard time sleeping, only getting 2 hrs a night, no improvement with medication and sleep aids, hip gets sore in the front at times        General:  Chief Complaint: L hip surgery  Onset: original injury December 10, 2024  MARGARITO: she was stepping down from a stepstool, stepping into a raised dog bowel with her left foot, twisted on the leg and fell backwards and landed on her backside   Pain:   L hip 3-4 /10, ankle and foot L>R sensitivity, pressure, and standing worsen    Relevant Information (PMH & Previous Tests/Imaging): + imaging prior to surgery    Previous Interventions/Treatments: Physical Therapy and Injections    Prior Level of Function (PLOF)  Patient previously independent with all ADLs  Exercise/Physical Activity: Active with children  Work/School: Respiratory therapist    Patients Living Environment: Reviewed and no concern  Primary Language: English  Patient's  "Goal(s) for Therapy: reduce pain and improve pain free     Red Flags: Do you have any of the following? No  Fever/chills, unexplained weight changes, dizziness/fainting, unexplained change in bowel or bladder functions, unexplained malaise or muscle weakness, night pain/sweats, numbness or tingling    Objective:  Objective     Palpation/Observation  Presents with T scope and alicia axillary crutches   Posture  Neutral LE alignment supine  Special Testing    ROM  7/31/2025  100 deg hip flexion, 40 deg abd, neutral ADD, IR 35 deg, ER 40 deg  Strength  7/31/2025  Sensation  Limited sensation anterior pain  Reflexes      Transfers: mod UE assist, external support for L LE movement   Gait: non reciprocal flat foot pattern with crutches      Treatments:  Ther-EX:  Graded motor imagery for shoe and sock application  Side planks on knees 2 x 5 10\"  Resisted bridges RTB x 15  Half kneeling hip flexor stretch 3 x 15-30\"  4 way hip stand RTB x 10 ea  Reviewed/discussed mirror therapy        Manual:  Soft tissue techniques hip flexor quad glutes and adductor area., PROM L hip    Modalities:  Application of dry needling of thigh proximal to distal lateral aspect of medial calf, use of Estim at [id number] Intensity alicia. Patient provided verbal consent, no adverse reactions noted       PT Therapeutic Procedures Time Entry  Therapeutic Exercise Time Entry: 25  Manual Therapy Time Entry: 10  PT Modalities Time Entry  E-Stim (Attended) Time Entry: 10    EDUCATION: Home exercise program, plan of care, activity modifications, pain management, and injury pathology     Code: [email or web address]    Medical History Form: Reviewed (scanned into chart)  Reviewed medical form, [place], Falls risk, Sabianism beliefs, PHQ  Annually for patients age 55 and older and patients with life-threatening illness and more frequently at the discretion of the provider when there has been a change in patient condition/clinical indication.   Screening  Date Last " Completed   Advance Directives 7/9/2025     Annually during a primary care office visit and at the discretion of the provider when there has been a change in patient condition/clinical indication.  Depression Screening 7/9/2025   Suicide Risk Screening 5/22/2025     Annually during any provider office visit and at the discretion of the provider when there has been a change in patient condition/clinical indication.  Alcohol Screening 7/16/2025  1:38 PM   Substance Use Screening 7/16/2025  1:38 PM    Tobacco 7/17/2025  8:36 AM     Completed at the discretion of the provider during any provider office visit or when there has been a change in patient condition/clinical indication.  Domestic Violence 7/9/2025   Human Trafficking    Spiritual/Cultural  7/9/2025   Food Insecurity Screening Social Determinants of Health     Patient Education/Key Learner 7/9/2025   Pain Screening 7/9/2025     Completed per Adult Falls Prevention (Outpatient), CP-119  Falls Risk Screening            Time Calculation  Start Time: 1207  Stop Time: 1250  Time Calculation (min): 43 min    PT Therapeutic Procedures Time Entry  Therapeutic Exercise Time Entry: 25  Manual Therapy Time Entry: 10 PT Modalities Time Entry  E-Stim (Attended) Time Entry: 10        Assessment: Patient presents with signs and symptoms consistent with L hip labral repair and now CRPS, resulting in limited participation in pain-free ADLs and inability to perform at their prior level of function. Responding better with less pain during treatment, addressed hip flexor tightness and 4 way hip strengthening  Prognosis: good  Response to care: good  Clinical Presentation: Stable and/or uncomplicated characteristics  Personal Factors: None    Problem List:  Pain  function  Mobility  Strength  Plan:     Planned Interventions include: therapeutic exercise, self-care home management, manual therapy, therapeutic activities, gait training, neuromuscular coordination, vasopneumatic, dry  needling, aquatic therapy    Next Treatment:hip hikes, mirror therapy for desensitization, light hip loading  Frequency: 2 x Week  Duration: 12 Weeks  Goals: Set and discussed today  6 weeks    1. Pt will be independent with HEP.  2. Pt will demonstrated full pain free flexion hip ROM.  3.  Pt will demonstrated gross 4/5 L LE strength.  4.  Pt will report at least 85% on HOS ADL scale.  5.  Pt will demo DL and SL squats without deviations, demonstrating good body mechanics and alignment.    12 weeks  6.  Pt will report no pain with cooking, cleaning, sleep, ADL's, and driving.  7.  Pt will ambulate for at least 45' without c/o pain.  8. Pt will participate in a return to plyometrics/joint loading program to allow safe return to run.  9.  Pt will demonstrated gross 4+/5 L LE strength.    Plan of care was developed with input and agreement by the patient      Omero Greene, PT

## 2025-08-01 PROCEDURE — RXMED WILLOW AMBULATORY MEDICATION CHARGE

## 2025-08-04 ENCOUNTER — APPOINTMENT (OUTPATIENT)
Dept: PHYSICAL THERAPY | Facility: CLINIC | Age: 40
End: 2025-08-04
Payer: COMMERCIAL

## 2025-08-06 ENCOUNTER — ANCILLARY PROCEDURE (OUTPATIENT)
Dept: RADIOLOGY | Facility: EXTERNAL LOCATION | Age: 40
End: 2025-08-06
Payer: COMMERCIAL

## 2025-08-06 DIAGNOSIS — G90.522 COMPLEX REGIONAL PAIN SYNDROME I OF LEFT LOWER LIMB: ICD-10-CM

## 2025-08-06 PROCEDURE — 64520 N BLOCK LUMBAR/THORACIC: CPT | Performed by: ANESTHESIOLOGY

## 2025-08-06 NOTE — PROGRESS NOTES
Preprocedure diagnosis: CRPS of left lower extremity  Postprocedure diagnosis CRPS of left lower extremity    Procedure performed: Left lumbar sympathetic block    Physician: Lopez Walls MD    Anesthesia: The patient received light sedation/anxiolysis for the procedure today.  The patient had a normal response to verbal stimulation throughout the entire procedure.  Airway, ventilation, and cardiovascular systems were unaffected.        Complications: none    Blood loss:  none    Clinical note: This is a very pleasant 40-year-old female who suffers with left leg pain here meeting all medical criteria for above-mentioned procedure.    Procedure: Lumbar Sympathetic Block     Diagnosis:  Procedure: Left lumbar Sympathetic Block    The patient was identified in the preoperative area.  The procedure was discussed in detail including its risks, benefits, and alternatives.  Signed consent was obtained and the patient agreed to proceed.     The patient was brought to the [facility] procedure room and placed in the prone position onto the fluoroscopy table. The lumbosacral area was prepped and draped in the usual sterile fashion using Chloroprep and a fenestrated drape. The anatomical target site was determined using fluoroscopy by squaring off the superior endplate of L3, and then ipsilaterally obliquing the C-arm intensifier until the tip of the left L3 transverse process was at the anterolateral border of the L3 vertebral body. The skin was anesthetized with 10ml of 2% lidocaine and the intended needle entry site.  A 22-gauge 6 -inch Chiba needle was advanced to the superior anterolateral border of the L3 vertebral body utilizing intermittent fluoroscopy in the AP, oblique, and lateral views. Needle aspiration was performed for heme and CSF, which was negative.  Then 2ml of Omnipaque 240 contrast was injected under live fluoroscopy which showed superior and inferior spread and no vascular uptake. Following,  20ml of 0.5% preservative free ropivacaine was then injected slowly.  The needle was removed and a sterile bandage was applied.      A detectable increase in skin temperature was achieved in the left lower extremity.      The procedure was completed without complications and was tolerated well. The patient was monitored after the procedure. The patient (or responsible party) was given post-procedure and discharge instructions to follow at home. The patient was discharged in stable condition. A follow up appointment was made.

## 2025-08-07 ENCOUNTER — TREATMENT (OUTPATIENT)
Dept: PHYSICAL THERAPY | Facility: CLINIC | Age: 40
End: 2025-08-07
Payer: COMMERCIAL

## 2025-08-07 DIAGNOSIS — S73.192D TEAR OF LEFT ACETABULAR LABRUM, SUBSEQUENT ENCOUNTER: ICD-10-CM

## 2025-08-07 DIAGNOSIS — S76.012D STRAIN OF HIP AND THIGH, LEFT, SUBSEQUENT ENCOUNTER: ICD-10-CM

## 2025-08-07 DIAGNOSIS — S76.912D STRAIN OF HIP AND THIGH, LEFT, SUBSEQUENT ENCOUNTER: ICD-10-CM

## 2025-08-07 PROCEDURE — 97140 MANUAL THERAPY 1/> REGIONS: CPT | Mod: GP | Performed by: PHYSICAL THERAPIST

## 2025-08-07 PROCEDURE — 97110 THERAPEUTIC EXERCISES: CPT | Mod: GP | Performed by: PHYSICAL THERAPIST

## 2025-08-07 NOTE — PROGRESS NOTES
Physical Therapy  Physical Therapy Orthopedic Evaluation    Patient Name: Kitty Dunbar  MRN: [id number]  Today's Date: 8/7/2025  Time Calculation  Start Time: 1200  Stop Time: 1240  Time Calculation (min): 40 min       PT Therapeutic Procedures Time Entry  Therapeutic Exercise Time Entry: 15  Manual Therapy Time Entry: 25       Insurance:  Visit number: 13  Authorization APPROVED 30 VISITS (USED 2 VISITS), 1/23/25-8/29/25  Insurance Type: Payor:  EMPLOYEE MEDICAL PLAN / Plan:  EMPLOYEE MEDICAL PLAN TRADITIONAL  / Product Type: *No Product type* /      Current Problem     Problem List Items Addressed This Visit           ICD-10-CM    Tear of left acetabular labrum S73.192A    Strain of hip and thigh, left, subsequent encounter S76.012D, S76.912D           Surgery:David Olmedo MD 5/22/25 Left Hip Arthroscopy; Labral Repair; Rim Trim; Osteoplasty; Capsular Plication       Referred by: David Olmedo MD      Precautions:       Subjective:   Subjective reports that she is having a hard time sleeping,still difficulty with nerve pain, and redness/blood flow consistently of both feet. Pain and pinching in both hips R>L with squatting . Difficult to tell relief from sympathetic nerve block yesterday        General:  Chief Complaint: L hip surgery  Onset: original injury December 10, 2024  MARGARITO: she was stepping down from a stepstool, stepping into a raised dog bowel with her left foot, twisted on the leg and fell backwards and landed on her backside   Pain:   L hip 3-4 /10, ankle and foot L>R sensitivity, pressure, and standing worsen    Relevant Information (PMH & Previous Tests/Imaging): + imaging prior to surgery    Previous Interventions/Treatments: Physical Therapy and Injections    Prior Level of Function (PLOF)  Patient previously independent with all ADLs  Exercise/Physical Activity: Active with children  Work/School: Respiratory therapist    Patients Living Environment: Reviewed and no  "concern  Primary Language: English  Patient's Goal(s) for Therapy: reduce pain and improve pain free     Red Flags: Do you have any of the following? No  Fever/chills, unexplained weight changes, dizziness/fainting, unexplained change in bowel or bladder functions, unexplained malaise or muscle weakness, night pain/sweats, numbness or tingling    Objective:  Objective     Palpation/Observation  Presents withno crutches  Posture  Neutral LE alignment supine  Special Testing    ROM  8/7/2025  110 deg hip flexion, 40 deg abd, neutral ADD, IR 35 deg, ER 40 deg  Strength  8/7/2025  Sensation  Limited sensation anterior pain  Reflexes      Transfers: mod UE assist, external support for L LE movement   Gait: non reciprocal flat foot pattern with crutches      Treatments:  Ther-EX:    Half kneeling hip flexor stretch 3 x 15-30\"  Side stepping ytb 6 x 10\"  Squats with TRX bands 3 x10 graded heights using foam pads 2-1- none  Wall squats with ball x 12          Manual:  Soft tissue techniques hip flexor quad glutes and adductor area., PROM L hip    Modalities:  Attempted IDN with estim but increased sensitivity, discontinued        PT Therapeutic Procedures Time Entry  Therapeutic Exercise Time Entry: 15  Manual Therapy Time Entry: 25       EDUCATION: Home exercise program, plan of care, activity modifications, pain management, and injury pathology     Code: [email or web address]    Medical History Form: Reviewed (scanned into chart)  Reviewed medical form, [place], Falls risk, Caodaism beliefs, PHQ  Annually for patients age 55 and older and patients with life-threatening illness and more frequently at the discretion of the provider when there has been a change in patient condition/clinical indication.   Screening  Date Last Completed   Advance Directives 7/9/2025     Annually during a primary care office visit and at the discretion of the provider when there has been a change in patient condition/clinical indication.  Depression " Screening 7/9/2025   Suicide Risk Screening 5/22/2025     Annually during any provider office visit and at the discretion of the provider when there has been a change in patient condition/clinical indication.  Alcohol Screening 7/16/2025  1:38 PM   Substance Use Screening 7/16/2025  1:38 PM    Tobacco 7/17/2025  8:36 AM     Completed at the discretion of the provider during any provider office visit or when there has been a change in patient condition/clinical indication.  Domestic Violence 7/9/2025   Human Trafficking    Spiritual/Cultural  7/9/2025   Food Insecurity Screening Social Determinants of Health     Patient Education/Key Learner 7/9/2025   Pain Screening 7/9/2025     Completed per Adult Falls Prevention (Outpatient), CP-119  Falls Risk Screening            Time Calculation  Start Time: 1200  Stop Time: 1240  Time Calculation (min): 40 min    PT Therapeutic Procedures Time Entry  Therapeutic Exercise Time Entry: 15  Manual Therapy Time Entry: 25          Assessment: Patient presents with signs and symptoms consistent with L hip labral repair and now CRPS, resulting in limited participation in pain-free ADLs and inability to perform at their prior level of function. Improved movement without crutches or assistive device today. More nerve sensitivity with needling, anterior hip pain across hip flexor but has limited tolerance with joint mobilizations secondary to nerve pain  Prognosis: good  Response to care: good  Clinical Presentation: Stable and/or uncomplicated characteristics  Personal Factors: None    Problem List:  Pain  function  Mobility  Strength  Plan:     Planned Interventions include: therapeutic exercise, self-care home management, manual therapy, therapeutic activities, gait training, neuromuscular coordination, vasopneumatic, dry needling, aquatic therapy    Next Treatment:hip hikes, mirror therapy for desensitization, light hip loading  Frequency: 2 x Week  Duration: 12 Weeks  Goals: Set  and discussed today  6 weeks    1. Pt will be independent with HEP.  2. Pt will demonstrated full pain free flexion hip ROM.  3.  Pt will demonstrated gross 4/5 L LE strength.  4.  Pt will report at least 85% on HOS ADL scale.  5.  Pt will demo DL and SL squats without deviations, demonstrating good body mechanics and alignment.    12 weeks  6.  Pt will report no pain with cooking, cleaning, sleep, ADL's, and driving.  7.  Pt will ambulate for at least 45' without c/o pain.  8. Pt will participate in a return to plyometrics/joint loading program to allow safe return to run.  9.  Pt will demonstrated gross 4+/5 L LE strength.    Plan of care was developed with input and agreement by the patient      Omero Greene, PT

## 2025-08-11 ENCOUNTER — APPOINTMENT (OUTPATIENT)
Dept: PHYSICAL THERAPY | Facility: CLINIC | Age: 40
End: 2025-08-11
Payer: COMMERCIAL

## 2025-08-11 ENCOUNTER — OFFICE VISIT (OUTPATIENT)
Dept: ORTHOPEDIC SURGERY | Facility: HOSPITAL | Age: 40
End: 2025-08-11
Payer: COMMERCIAL

## 2025-08-11 ENCOUNTER — PHARMACY VISIT (OUTPATIENT)
Dept: PHARMACY | Facility: CLINIC | Age: 40
End: 2025-08-11
Payer: COMMERCIAL

## 2025-08-11 DIAGNOSIS — S73.192D TEAR OF ACETABULAR LABRUM, LEFT, SUBSEQUENT ENCOUNTER: Primary | ICD-10-CM

## 2025-08-11 PROCEDURE — 99024 POSTOP FOLLOW-UP VISIT: CPT | Performed by: ORTHOPAEDIC SURGERY

## 2025-08-11 PROCEDURE — 99212 OFFICE O/P EST SF 10 MIN: CPT

## 2025-08-11 ASSESSMENT — PAIN SCALES - GENERAL: PAINLEVEL_OUTOF10: 5 - MODERATE PAIN

## 2025-08-11 ASSESSMENT — PAIN - FUNCTIONAL ASSESSMENT: PAIN_FUNCTIONAL_ASSESSMENT: 0-10

## 2025-08-14 ENCOUNTER — APPOINTMENT (OUTPATIENT)
Dept: PHYSICAL THERAPY | Facility: CLINIC | Age: 40
End: 2025-08-14
Payer: COMMERCIAL

## 2025-08-20 ENCOUNTER — ANCILLARY PROCEDURE (OUTPATIENT)
Dept: RADIOLOGY | Facility: EXTERNAL LOCATION | Age: 40
End: 2025-08-20
Payer: COMMERCIAL

## 2025-08-20 DIAGNOSIS — M51.26 OTHER INTERVERTEBRAL DISC DISPLACEMENT, LUMBAR REGION: ICD-10-CM

## 2025-08-20 DIAGNOSIS — M54.16 RADICULOPATHY, LUMBAR REGION: ICD-10-CM

## 2025-08-21 ENCOUNTER — TREATMENT (OUTPATIENT)
Dept: PHYSICAL THERAPY | Facility: CLINIC | Age: 40
End: 2025-08-21
Payer: COMMERCIAL

## 2025-08-21 DIAGNOSIS — S73.192D TEAR OF LEFT ACETABULAR LABRUM, SUBSEQUENT ENCOUNTER: ICD-10-CM

## 2025-08-21 DIAGNOSIS — G90.522 COMPLEX REGIONAL PAIN SYNDROME TYPE 1 OF LEFT LOWER EXTREMITY: Primary | ICD-10-CM

## 2025-08-21 DIAGNOSIS — S76.012D STRAIN OF HIP AND THIGH, LEFT, SUBSEQUENT ENCOUNTER: ICD-10-CM

## 2025-08-21 DIAGNOSIS — S76.912D STRAIN OF HIP AND THIGH, LEFT, SUBSEQUENT ENCOUNTER: ICD-10-CM

## 2025-08-21 PROCEDURE — 97140 MANUAL THERAPY 1/> REGIONS: CPT | Mod: GP | Performed by: PHYSICAL THERAPIST

## 2025-08-21 PROCEDURE — 97110 THERAPEUTIC EXERCISES: CPT | Mod: GP | Performed by: PHYSICAL THERAPIST

## 2025-08-21 RX ORDER — GABAPENTIN 600 MG/1
600 TABLET ORAL 3 TIMES DAILY
Qty: 90 TABLET | Refills: 1 | Status: SHIPPED | OUTPATIENT
Start: 2025-08-21 | End: 2026-08-21

## 2025-08-28 ENCOUNTER — APPOINTMENT (OUTPATIENT)
Dept: PHYSICAL THERAPY | Facility: CLINIC | Age: 40
End: 2025-08-28
Payer: COMMERCIAL

## 2025-08-28 DIAGNOSIS — S73.192D TEAR OF LEFT ACETABULAR LABRUM, SUBSEQUENT ENCOUNTER: ICD-10-CM

## 2025-08-28 DIAGNOSIS — S76.012D STRAIN OF HIP AND THIGH, LEFT, SUBSEQUENT ENCOUNTER: ICD-10-CM

## 2025-08-28 DIAGNOSIS — S76.912D STRAIN OF HIP AND THIGH, LEFT, SUBSEQUENT ENCOUNTER: ICD-10-CM

## 2025-09-04 ENCOUNTER — OFFICE VISIT (OUTPATIENT)
Facility: CLINIC | Age: 40
End: 2025-09-04
Payer: COMMERCIAL

## 2025-09-04 ENCOUNTER — TREATMENT (OUTPATIENT)
Dept: PHYSICAL THERAPY | Facility: CLINIC | Age: 40
End: 2025-09-04
Payer: COMMERCIAL

## 2025-09-04 VITALS
DIASTOLIC BLOOD PRESSURE: 70 MMHG | RESPIRATION RATE: 22 BRPM | BODY MASS INDEX: 26.46 KG/M2 | HEIGHT: 64 IN | HEART RATE: 77 BPM | SYSTOLIC BLOOD PRESSURE: 126 MMHG | OXYGEN SATURATION: 99 % | WEIGHT: 155 LBS

## 2025-09-04 DIAGNOSIS — M54.16 LUMBAR RADICULOPATHY: ICD-10-CM

## 2025-09-04 DIAGNOSIS — M62.838 MUSCLE SPASM: ICD-10-CM

## 2025-09-04 DIAGNOSIS — S73.192D TEAR OF LEFT ACETABULAR LABRUM, SUBSEQUENT ENCOUNTER: ICD-10-CM

## 2025-09-04 DIAGNOSIS — M54.2 ACUTE NECK PAIN: ICD-10-CM

## 2025-09-04 DIAGNOSIS — S76.912D STRAIN OF HIP AND THIGH, LEFT, SUBSEQUENT ENCOUNTER: ICD-10-CM

## 2025-09-04 DIAGNOSIS — G90.523 COMPLEX REGIONAL PAIN SYNDROME TYPE 1 OF BOTH LOWER EXTREMITIES: ICD-10-CM

## 2025-09-04 DIAGNOSIS — S76.012D STRAIN OF HIP AND THIGH, LEFT, SUBSEQUENT ENCOUNTER: ICD-10-CM

## 2025-09-04 DIAGNOSIS — M79.10 MYALGIA: ICD-10-CM

## 2025-09-04 DIAGNOSIS — G90.522 COMPLEX REGIONAL PAIN SYNDROME TYPE 1 OF LEFT LOWER EXTREMITY: Primary | ICD-10-CM

## 2025-09-04 PROCEDURE — 3008F BODY MASS INDEX DOCD: CPT | Performed by: ANESTHESIOLOGY

## 2025-09-04 PROCEDURE — 99214 OFFICE O/P EST MOD 30 MIN: CPT | Performed by: ANESTHESIOLOGY

## 2025-09-04 PROCEDURE — 97110 THERAPEUTIC EXERCISES: CPT | Mod: GP | Performed by: PHYSICAL THERAPIST

## 2025-09-04 PROCEDURE — 1036F TOBACCO NON-USER: CPT | Performed by: ANESTHESIOLOGY

## 2025-09-04 PROCEDURE — RXMED WILLOW AMBULATORY MEDICATION CHARGE

## 2025-09-04 PROCEDURE — 99212 OFFICE O/P EST SF 10 MIN: CPT

## 2025-09-04 PROCEDURE — 97140 MANUAL THERAPY 1/> REGIONS: CPT | Mod: GP | Performed by: PHYSICAL THERAPIST

## 2025-09-04 PROCEDURE — 97032 APPL MODALITY 1+ESTIM EA 15: CPT | Mod: GP | Performed by: PHYSICAL THERAPIST

## 2025-09-04 RX ORDER — GABAPENTIN 600 MG/1
600 TABLET ORAL 3 TIMES DAILY
Qty: 90 TABLET | Refills: 1 | Status: SHIPPED | OUTPATIENT
Start: 2025-09-04 | End: 2026-09-04

## 2025-09-04 RX ORDER — TIZANIDINE 4 MG/1
4 TABLET ORAL EVERY 8 HOURS PRN
Qty: 90 TABLET | Refills: 2 | Status: SHIPPED | OUTPATIENT
Start: 2025-09-04 | End: 2025-10-05

## 2025-09-04 ASSESSMENT — ENCOUNTER SYMPTOMS
CONSTITUTIONAL NEGATIVE: 1
EYES NEGATIVE: 1
ENDOCRINE NEGATIVE: 1
GASTROINTESTINAL NEGATIVE: 1
PSYCHIATRIC NEGATIVE: 1
HEMATOLOGIC/LYMPHATIC NEGATIVE: 1
CARDIOVASCULAR NEGATIVE: 1
PAIN: 1
NUMBNESS: 1
ALLERGIC/IMMUNOLOGIC NEGATIVE: 1
NECK STIFFNESS: 1
NECK PAIN: 1
MYALGIAS: 1
RESPIRATORY NEGATIVE: 1
WEAKNESS: 1

## 2025-09-04 ASSESSMENT — PAIN - FUNCTIONAL ASSESSMENT: PAIN_FUNCTIONAL_ASSESSMENT: 0-10

## 2025-09-04 ASSESSMENT — PATIENT HEALTH QUESTIONNAIRE - PHQ9
1. LITTLE INTEREST OR PLEASURE IN DOING THINGS: NOT AT ALL
SUM OF ALL RESPONSES TO PHQ9 QUESTIONS 1 & 2: 0
2. FEELING DOWN, DEPRESSED OR HOPELESS: NOT AT ALL

## 2025-09-04 ASSESSMENT — PAIN DESCRIPTION - DESCRIPTORS: DESCRIPTORS: ACHING;SHARP;NAGGING

## 2025-09-04 ASSESSMENT — PAIN SCALES - GENERAL
PAINLEVEL_OUTOF10: 5 - MODERATE PAIN
PAINLEVEL_OUTOF10: 5

## 2025-09-05 ENCOUNTER — TELEMEDICINE (OUTPATIENT)
Dept: PRIMARY CARE | Facility: CLINIC | Age: 40
End: 2025-09-05
Payer: COMMERCIAL

## 2025-09-05 ENCOUNTER — APPOINTMENT (OUTPATIENT)
Dept: PRIMARY CARE | Facility: CLINIC | Age: 40
End: 2025-09-05
Payer: COMMERCIAL

## 2025-09-05 ENCOUNTER — PHARMACY VISIT (OUTPATIENT)
Dept: PHARMACY | Facility: CLINIC | Age: 40
End: 2025-09-05
Payer: COMMERCIAL

## 2025-09-05 DIAGNOSIS — J40 BRONCHITIS: Primary | ICD-10-CM

## 2025-09-05 PROCEDURE — 99214 OFFICE O/P EST MOD 30 MIN: CPT

## 2025-09-05 PROCEDURE — RXMED WILLOW AMBULATORY MEDICATION CHARGE

## 2025-09-05 RX ORDER — DOXYCYCLINE 100 MG/1
100 CAPSULE ORAL 2 TIMES DAILY
Qty: 10 CAPSULE | Refills: 0 | Status: SHIPPED | OUTPATIENT
Start: 2025-09-05 | End: 2025-09-10

## 2025-09-05 ASSESSMENT — ENCOUNTER SYMPTOMS
COUGH: 1
HEADACHES: 1
SINUS PAIN: 1

## 2025-09-05 ASSESSMENT — LIFESTYLE VARIABLES: HISTORY_OF_SMOKING: I SMOKED IN THE PAST, BUT QUIT

## (undated) DEVICE — BLADE, GREAT WHITE CONCAVE, 4.2MM, PREBENT

## (undated) DEVICE — SUTURE TAPE, XBRAID, 1.4MM BLACK/WHITE

## (undated) DEVICE — BUR, SPHERICAL, 5.5MM, PREBENT

## (undated) DEVICE — GLOVE, SURGICAL, PROTEXIS PI BLUE W/NEUTHERA, 7.0, PF, LF

## (undated) DEVICE — SUTURE, ETHILON, 3-0, 18 IN, PS1, BLACK

## (undated) DEVICE — MAT, FLOOR, SURGISAFE, FLUID CONTROL, 46X40

## (undated) DEVICE — DRAPE, INSTRUMENT, W/POUCH, STERI DRAPE, 9 5/8 X 18 LONG

## (undated) DEVICE — SYRINGE, 10 CC, LUER LOCK

## (undated) DEVICE — ARTHROWAND, MULTIVAC 50XL, ICW

## (undated) DEVICE — SLINGSHOT, 45 DEG UP

## (undated) DEVICE — KIT, HIP POSTFREE, MEDIUM, GUARDIAN

## (undated) DEVICE — ENTRY KIT, PORTAL

## (undated) DEVICE — KIT, HIP FRACTURES AND SCOPES, CUSTOM, AHUJA

## (undated) DEVICE — GLOVE, SURGICAL, PROTEXIS PI MICRO, 6.5, PF, LF

## (undated) DEVICE — CANNULA, FLOWPORT II, WITH OBTURATOR

## (undated) DEVICE — FORCE FIBER P2,  BLUE CO-BRSAID, 38IN STRAND

## (undated) DEVICE — GLOVE, SURGICAL, PROTEXIS PI MICRO, 8.0, PF, LF

## (undated) DEVICE — BUR, SPHERICAL, 4.5MM, PREBENT

## (undated) DEVICE — SUTURE MANAGER, NANOPASSER, CRESCENT

## (undated) DEVICE — CANNULA, 7 X 110

## (undated) DEVICE — TUBING, NFLOW, FMS VUE, SNGL USE

## (undated) DEVICE — TUBING, OUTFLOW, DRAIN PIPE, W/ONE WAY VALVE (FMS VUE)

## (undated) DEVICE — GLOVE, SURGICAL, PROTEXIS PI W/NEU-THERA, 8.5, PF, LF

## (undated) DEVICE — DRAPE, C-ARM IMAGE

## (undated) DEVICE — DRILL, ICONIX, 1.4MM, DISPOSABLE

## (undated) DEVICE — ADAPTER, Y TUBING STERILE